# Patient Record
Sex: FEMALE | Race: WHITE | Employment: UNEMPLOYED | URBAN - METROPOLITAN AREA
[De-identification: names, ages, dates, MRNs, and addresses within clinical notes are randomized per-mention and may not be internally consistent; named-entity substitution may affect disease eponyms.]

---

## 2018-05-31 ENCOUNTER — HOSPITAL ENCOUNTER (OUTPATIENT)
Dept: NON INVASIVE DIAGNOSTICS | Age: 68
Discharge: HOME | End: 2018-05-31
Payer: MEDICARE

## 2018-05-31 DIAGNOSIS — Z01.810 PRE-OPERATIVE CARDIOVASCULAR EXAMINATION: ICD-10-CM

## 2018-05-31 LAB
ATRIAL RATE: 83 BPM
CALCULATED P AXIS, ECG09: 70 DEGREES
CALCULATED R AXIS, ECG10: 49 DEGREES
CALCULATED T AXIS, ECG11: 44 DEGREES
DIAGNOSIS, 93000: NORMAL
P-R INTERVAL, ECG05: 154 MS
Q-T INTERVAL, ECG07: 386 MS
QRS DURATION, ECG06: 88 MS
QTC CALCULATION (BEZET), ECG08: 453 MS
VENTRICULAR RATE, ECG03: 83 BPM

## 2018-05-31 PROCEDURE — 93005 ELECTROCARDIOGRAM TRACING: CPT

## 2021-07-07 NOTE — PERIOP NOTES
COPIED FROM SCHEDULING NOTES:  SCHED.  PAT APPT 7-8-21 AT 1130/ALL COVID QUESTIONS ANSWERED/PT FULLY VACCINATED MODERNA 3-1-21 AND 3-29-21/PT INSTRUCTED TO BRING MODERNA CARD TO PAT APPT

## 2021-07-09 ENCOUNTER — HOSPITAL ENCOUNTER (OUTPATIENT)
Dept: PREADMISSION TESTING | Age: 71
Discharge: HOME | End: 2021-07-09
Payer: MEDICARE

## 2021-07-09 VITALS
TEMPERATURE: 99.1 F | RESPIRATION RATE: 18 BRPM | WEIGHT: 185.19 LBS | BODY MASS INDEX: 34.08 KG/M2 | SYSTOLIC BLOOD PRESSURE: 152 MMHG | HEIGHT: 62 IN | DIASTOLIC BLOOD PRESSURE: 85 MMHG | HEART RATE: 82 BPM

## 2021-07-09 LAB
ABO + RH BLD: NORMAL
ANION GAP SERPL CALC-SCNC: 5 MMOL/L (ref 5–15)
APPEARANCE UR: CLEAR
ATRIAL RATE: 96 BPM
BACTERIA URNS QL MICRO: NEGATIVE /HPF
BILIRUB UR QL: NEGATIVE
BLOOD GROUP ANTIBODIES SERPL: NORMAL
BUN SERPL-MCNC: 13 MG/DL (ref 6–20)
BUN/CREAT SERPL: 16 (ref 12–20)
CALCIUM SERPL-MCNC: 9.1 MG/DL (ref 8.5–10.1)
CALCULATED P AXIS, ECG09: 68 DEGREES
CALCULATED R AXIS, ECG10: 42 DEGREES
CALCULATED T AXIS, ECG11: 40 DEGREES
CHLORIDE SERPL-SCNC: 106 MMOL/L (ref 97–108)
CO2 SERPL-SCNC: 29 MMOL/L (ref 21–32)
COLOR UR: NORMAL
CREAT SERPL-MCNC: 0.83 MG/DL (ref 0.55–1.02)
DIAGNOSIS, 93000: NORMAL
EPITH CASTS URNS QL MICRO: NORMAL /LPF
ERYTHROCYTE [DISTWIDTH] IN BLOOD BY AUTOMATED COUNT: 13.2 % (ref 11.5–14.5)
EST. AVERAGE GLUCOSE BLD GHB EST-MCNC: 111 MG/DL
GLUCOSE SERPL-MCNC: 94 MG/DL (ref 65–100)
GLUCOSE UR STRIP.AUTO-MCNC: NEGATIVE MG/DL
HBA1C MFR BLD: 5.5 % (ref 4–5.6)
HCT VFR BLD AUTO: 40.2 % (ref 35–47)
HGB BLD-MCNC: 12.2 G/DL (ref 11.5–16)
HGB UR QL STRIP: NEGATIVE
HYALINE CASTS URNS QL MICRO: NORMAL /LPF (ref 0–5)
INR PPP: 0.9 (ref 0.9–1.1)
KETONES UR QL STRIP.AUTO: NEGATIVE MG/DL
LEUKOCYTE ESTERASE UR QL STRIP.AUTO: NEGATIVE
MCH RBC QN AUTO: 28 PG (ref 26–34)
MCHC RBC AUTO-ENTMCNC: 30.3 G/DL (ref 30–36.5)
MCV RBC AUTO: 92.4 FL (ref 80–99)
NITRITE UR QL STRIP.AUTO: NEGATIVE
NRBC # BLD: 0 K/UL (ref 0–0.01)
NRBC BLD-RTO: 0 PER 100 WBC
P-R INTERVAL, ECG05: 154 MS
PH UR STRIP: 6.5 [PH] (ref 5–8)
PLATELET # BLD AUTO: 223 K/UL (ref 150–400)
PMV BLD AUTO: 9.8 FL (ref 8.9–12.9)
POTASSIUM SERPL-SCNC: 4.2 MMOL/L (ref 3.5–5.1)
PROT UR STRIP-MCNC: NEGATIVE MG/DL
PROTHROMBIN TIME: 9.9 SEC (ref 9–11.1)
Q-T INTERVAL, ECG07: 336 MS
QRS DURATION, ECG06: 82 MS
QTC CALCULATION (BEZET), ECG08: 424 MS
RBC # BLD AUTO: 4.35 M/UL (ref 3.8–5.2)
RBC #/AREA URNS HPF: NORMAL /HPF (ref 0–5)
SODIUM SERPL-SCNC: 140 MMOL/L (ref 136–145)
SP GR UR REFRACTOMETRY: 1.01 (ref 1–1.03)
SPECIMEN EXP DATE BLD: NORMAL
UA: UC IF INDICATED,UAUC: NORMAL
UROBILINOGEN UR QL STRIP.AUTO: 0.2 EU/DL (ref 0.2–1)
VENTRICULAR RATE, ECG03: 96 BPM
WBC # BLD AUTO: 5.9 K/UL (ref 3.6–11)
WBC URNS QL MICRO: NORMAL /HPF (ref 0–4)

## 2021-07-09 PROCEDURE — 36415 COLL VENOUS BLD VENIPUNCTURE: CPT

## 2021-07-09 PROCEDURE — 93005 ELECTROCARDIOGRAM TRACING: CPT

## 2021-07-09 PROCEDURE — 86901 BLOOD TYPING SEROLOGIC RH(D): CPT

## 2021-07-09 PROCEDURE — 85027 COMPLETE CBC AUTOMATED: CPT

## 2021-07-09 PROCEDURE — 85610 PROTHROMBIN TIME: CPT

## 2021-07-09 PROCEDURE — 83036 HEMOGLOBIN GLYCOSYLATED A1C: CPT

## 2021-07-09 PROCEDURE — 81001 URINALYSIS AUTO W/SCOPE: CPT

## 2021-07-09 PROCEDURE — 80048 BASIC METABOLIC PNL TOTAL CA: CPT

## 2021-07-09 RX ORDER — NAPROXEN SODIUM 220 MG
220 TABLET ORAL
COMMUNITY
End: 2021-07-18

## 2021-07-09 RX ORDER — OMEPRAZOLE 20 MG/1
20 CAPSULE, DELAYED RELEASE ORAL
COMMUNITY

## 2021-07-09 RX ORDER — ACETAMINOPHEN 325 MG/1
650 TABLET ORAL
COMMUNITY

## 2021-07-09 RX ORDER — CETIRIZINE HCL 10 MG
10 TABLET ORAL
COMMUNITY

## 2021-07-09 NOTE — H&P
Ronald Mayorga  : 1950   / Language: Atilio Asencio / Race: White  Female      History of Present Illness   The patient is a 69year old female who presents to the practice today for a transition into care. Additional reasons for visit:    Knee Pain is described as the following: The knee pain is described as being located in the entire knee (bilateral). Note for \"Knee Pain\": Patient presents today with her family for evaluation of bilateral knees. Left is now more painful than the right. Both knees have had x-rays in the past which showed bone-on-bone. She has had injections of steroids that only last for very short period of time. Activities of daily living are declining. She at this point is ready to consider knee replacement. She is here today with her daughter.       Problem List/Past Medical  Left Knee Swelling (719.06  M25.469)    REVIEW OF SYSTEMS: Systems were reviewed by the provider.    Left PF Chondromalacia (717.7  M22.42)    Knee effusion, left (719.06  M25.462)    DIETARY COUNSELING (V65.3)    Closed nondisplaced fracture of fifth left metatarsal bone, initial encounter (825.25  S92.355A)    Right PF Chondromalacia (717.7  M22.41)    Right hip pain (719.45  M25.551)    Primary osteoarthritis of left knee (715.16  M17.12)    Pain and swelling of ankle, left (719.47  M25.572, M25.472)    Primary osteoarthritis of right knee (715.16  M17.11)    Chronic pain of left knee (719.46  M25.562)    Weight above 97th percentile (V49.89  Z78.9)    Cyst, baker's knee, left (727.51  M71.22)    Chronic pain of right knee (719.46  M25.561)    Left Meniscus tear (836.2  S83.209A)    Arthritis of right hip (716.95  M16.11)    RIGHT ANKLE PAIN (719.47) (719.47  M25.571)    S/P arthroscopy of knee (V45.89  Z98.890)    Patient was referred to their primary care physician for Blood Pressure screening.    Generalized obesity (278.00  E66.9)    Bursitis, trochanteric (726.5)    Acute foot pain, left (729.5  M79.672)      Allergies   Erythromycins    Allergies Reconciled      Family History   Hypertension    Cancer    Breast cancer      Social History   Caffeine use   11/27/2013: Drinks coffee and soft drinks 3-4 times a day. Sun Exposure   11/27/2013: rarely  Tobacco use   Never smoker. Seat Belt Use   11/27/2013: always  Exercise   11/27/2013: Walks occasionally. Alcohol use   11/27/2013: Drinks 3 times per week. Illicit drug use   38/36/1295: none    Medication History   Diclofenac Sodium  (75MG Tablet DR, 1 (one) Tablet Oral BID, Taken starting 06/12/2018) Active. Claritin  (10MG Capsule, Oral) Active. Ibuprofen  (200MG Capsule, Oral) Active. Azelastine HCl  (0.1% Solution, Nasal) Active. ProAir RespiClick  (126 (90 Base)MCG/ACT Aero Pow Br Act, Inhalation) Active. Medications Reconciled     Past Surgical History  No pertinent past surgical history      Other Problems   Migraine Headache    Unspecified Diagnosis    Hip pain, left (719.45  M25.552)    Treatment options were discussed with the patient in full.          Review of Systems   General Not Present- Chills and Fatigue. Skin Not Present- Bruising, Pallor and Skin Color Changes. Respiratory Not Present- Cough and Difficulty Breathing. Cardiovascular Not Present- Chest Pain, Fainting / Blacking Out and Rapid Heart Rate. Musculoskeletal Present- Joint Pain. Not Present- Decreased Range of Motion and Joint Swelling. Neurological Not Present- Dysesthesia, Paresthesias and Weakness In Extremities. Hematology Not Present- Abnormal Bleeding, Blood Clots and Petechiae. Vitals  Weight: 175 lb   Height: 62 in   Weight was reported by patient. Height was reported by patient. Body Surface Area: 1.81 m²   Body Mass Index: 32.01 kg/m²      Physical Exam  Musculoskeletal  Global Assessment  Examination of related systems reveals - well-developed, well-nourished, in no acute distress, alert and oriented x 3.  Gait and Station - normal gait and station. Right Lower Extremity - Note: Patient's hip was examined. Impingement and apprehension signs are negative. Hip extends fully. Hip flexes to 90+ degrees. There is no trochanteric tenderness. Straight leg raise and femoral nerve stretch test are negative. Extremity is sensate and perfused with palpable dorsalis pedis pulse. Hip flexors, quads, ankle plantar flexors, ankle dorsiflexors have 5 out of 5 strength. Knee shows a slight varus overall alignment. Pain is along the medial joint line. There is no knee instability. Range is 5 to 120 degrees. Left Lower Extremity - Note: Patient's hip was examined. Impingement and apprehension signs are negative. Hip extends fully. Hip flexes to 90+ degrees. There is no trochanteric tenderness. Straight leg raise and femoral nerve stretch test are negative. Extremity is sensate and perfused with palpable dorsalis pedis pulse. Hip flexors, quads, ankle plantar flexors, ankle dorsiflexors have 5 out of 5 strength. Knee shows a slight varus overall alignment. Pain is along the medial joint line. There is no knee instability. Range is 5 to 120 degrees. Assessment & Plan     Primary osteoarthritis of both knees (715.16  M17.0)  Impression: Discussed options. Decline in function. She is of appropriate age. No other contraindications. Plan is for a left total knee. All questions were answered. Current Plans  Pt Education - How to [address] using Patient Portal and 3rd Party Apps: discussed with patient and provided information. Pt Education - Educational materials were provided.: discussed with patient and provided information.     REVIEW OF SYSTEMS: Systems were reviewed by the provider.(V49.9)      Weight above 97th percentile (V49.89  Z78.9)    Current Plans  LIFESTYLE EDUCATION REGARDING DIET (23297)    Bilateral chronic knee pain (719.46  M25.561)    Current Plans  X-RAY EXAM OF BOTH KNEES, STANDING AP (95113) (Bilateral AP Standing views were taken today using Digital Radiography. Both knees were x-rayed. Reviewed prior films.  Both knees are bone against bone medially with osteophytes and cyst.)      Signed by Maria Eugenia Figueredo MD (5/21/20

## 2021-07-09 NOTE — PERIOP NOTES
Preoperative and medication instructions reviewed with patient , surgical site infection sheet given,  MRSA instructions given and reviewed chg soap x 2 given and instructions on how to use chg soap correctly. Patient given opportunity to ask questions and all questions were answered.  Information given regarding covid testing and arrival to hospital on day of surgery    [address] 03/01/2021 AND 03/29/2021  COPY OF VACCINE RECORD ON CHART

## 2021-07-10 LAB
BACTERIA SPEC CULT: NORMAL
BACTERIA SPEC CULT: NORMAL
SERVICE CMNT-IMP: NORMAL

## 2021-07-12 RX ORDER — MUPIROCIN 20 MG/G
OINTMENT TOPICAL 2 TIMES DAILY
Qty: 22 G | Refills: 0 | Status: SHIPPED | OUTPATIENT
Start: 2021-07-12 | End: 2021-07-19

## 2021-07-12 NOTE — PERIOP NOTES
PC to pt, full name and  verified, regarding positive nasal cx (MSSA) and need to start Mupirocin ointment BID x 7 days to B nostrils starting today and bathe with CHG soap for 7 days prior to surgery. Pt verbalized understanding of instructions and will start today as recommended. Allergies and pharmacy of choice reviewed. Rx escribed to pt's pharmacy of choice. PTA medlist updated. Surgeon and PCP notified of positive culture and treatment. PRESCRIPTION:    MUPIROCIN 2% OINTMENT  QUANTITY:  #22 GRAMS  REFILLS: NONE    Apply 0.25 g (small pea-sized amount) to both nostrils twice a day for seven days.     Salma Chase, NP

## 2021-07-15 ENCOUNTER — ANESTHESIA EVENT (OUTPATIENT)
Dept: SURGERY | Age: 71
End: 2021-07-15
Payer: MEDICARE

## 2021-07-16 ENCOUNTER — HOSPITAL ENCOUNTER (OUTPATIENT)
Age: 71
Discharge: HOME HEALTH | End: 2021-07-18
Attending: ORTHOPAEDIC SURGERY | Admitting: ORTHOPAEDIC SURGERY
Payer: MEDICARE

## 2021-07-16 ENCOUNTER — ANESTHESIA (OUTPATIENT)
Dept: SURGERY | Age: 71
End: 2021-07-16
Payer: MEDICARE

## 2021-07-16 DIAGNOSIS — M17.12 PRIMARY OSTEOARTHRITIS OF LEFT KNEE: Primary | ICD-10-CM

## 2021-07-16 PROBLEM — M17.0 LOCALIZED OSTEOARTHRITIS OF KNEES, BILATERAL: Status: ACTIVE | Noted: 2021-07-16

## 2021-07-16 LAB
GLUCOSE BLD STRIP.AUTO-MCNC: 90 MG/DL (ref 65–117)
SERVICE CMNT-IMP: NORMAL

## 2021-07-16 PROCEDURE — 97530 THERAPEUTIC ACTIVITIES: CPT

## 2021-07-16 PROCEDURE — 97161 PT EVAL LOW COMPLEX 20 MIN: CPT

## 2021-07-16 PROCEDURE — 77030018673: Performed by: ORTHOPAEDIC SURGERY

## 2021-07-16 PROCEDURE — C1776 JOINT DEVICE (IMPLANTABLE): HCPCS | Performed by: ORTHOPAEDIC SURGERY

## 2021-07-16 PROCEDURE — 77030031139 HC SUT VCRL2 J&J -A: Performed by: ORTHOPAEDIC SURGERY

## 2021-07-16 PROCEDURE — 74011000258 HC RX REV CODE- 258: Performed by: NURSE ANESTHETIST, CERTIFIED REGISTERED

## 2021-07-16 PROCEDURE — 74011250636 HC RX REV CODE- 250/636: Performed by: ANESTHESIOLOGY

## 2021-07-16 PROCEDURE — 74011000250 HC RX REV CODE- 250: Performed by: ANESTHESIOLOGY

## 2021-07-16 PROCEDURE — 2709999900 HC NON-CHARGEABLE SUPPLY: Performed by: ORTHOPAEDIC SURGERY

## 2021-07-16 PROCEDURE — 74011000250 HC RX REV CODE- 250: Performed by: PHYSICIAN ASSISTANT

## 2021-07-16 PROCEDURE — 77030008463 HC STPLR SKN PROX J&J -B: Performed by: ORTHOPAEDIC SURGERY

## 2021-07-16 PROCEDURE — 74011250636 HC RX REV CODE- 250/636: Performed by: ORTHOPAEDIC SURGERY

## 2021-07-16 PROCEDURE — 74011250637 HC RX REV CODE- 250/637: Performed by: PHYSICIAN ASSISTANT

## 2021-07-16 PROCEDURE — 77030000032 HC CUF TRNQT ZIMM -B: Performed by: ORTHOPAEDIC SURGERY

## 2021-07-16 PROCEDURE — 77030005513 HC CATH URETH FOL11 MDII -B: Performed by: ORTHOPAEDIC SURGERY

## 2021-07-16 PROCEDURE — 74011000250 HC RX REV CODE- 250: Performed by: ORTHOPAEDIC SURGERY

## 2021-07-16 PROCEDURE — 74011250636 HC RX REV CODE- 250/636: Performed by: NURSE ANESTHETIST, CERTIFIED REGISTERED

## 2021-07-16 PROCEDURE — C1713 ANCHOR/SCREW BN/BN,TIS/BN: HCPCS | Performed by: ORTHOPAEDIC SURGERY

## 2021-07-16 PROCEDURE — C9290 INJ, BUPIVACAINE LIPOSOME: HCPCS | Performed by: ORTHOPAEDIC SURGERY

## 2021-07-16 PROCEDURE — 77030008462 HC STPLR SKN PROX J&J -A: Performed by: ORTHOPAEDIC SURGERY

## 2021-07-16 PROCEDURE — 74011000250 HC RX REV CODE- 250: Performed by: NURSE ANESTHETIST, CERTIFIED REGISTERED

## 2021-07-16 PROCEDURE — 77030006822 HC BLD SAW SAG BRSM -B: Performed by: ORTHOPAEDIC SURGERY

## 2021-07-16 PROCEDURE — 77030041279 HC DRSG PRMSL AG MDII -B: Performed by: ORTHOPAEDIC SURGERY

## 2021-07-16 PROCEDURE — 77030010783 HC BOWL MX BN CEM J&J -B: Performed by: ORTHOPAEDIC SURGERY

## 2021-07-16 PROCEDURE — 97116 GAIT TRAINING THERAPY: CPT

## 2021-07-16 PROCEDURE — 76060000035 HC ANESTHESIA 2 TO 2.5 HR: Performed by: ORTHOPAEDIC SURGERY

## 2021-07-16 PROCEDURE — 74011250637 HC RX REV CODE- 250/637: Performed by: ANESTHESIOLOGY

## 2021-07-16 PROCEDURE — 77030035236 HC SUT PDS STRATFX BARB J&J -B: Performed by: ORTHOPAEDIC SURGERY

## 2021-07-16 PROCEDURE — 77030040361 HC SLV COMPR DVT MDII -B

## 2021-07-16 PROCEDURE — 82962 GLUCOSE BLOOD TEST: CPT

## 2021-07-16 PROCEDURE — 76210000006 HC OR PH I REC 0.5 TO 1 HR: Performed by: ORTHOPAEDIC SURGERY

## 2021-07-16 PROCEDURE — 76010000171 HC OR TIME 2 TO 2.5 HR INTENSV-TIER 1: Performed by: ORTHOPAEDIC SURGERY

## 2021-07-16 PROCEDURE — 74011000258 HC RX REV CODE- 258: Performed by: ORTHOPAEDIC SURGERY

## 2021-07-16 PROCEDURE — 74011250636 HC RX REV CODE- 250/636: Performed by: PHYSICIAN ASSISTANT

## 2021-07-16 DEVICE — SMARTSET GHV GENTAMICIN HIGH VISCOSITY BONE CEMENT 40G
Type: IMPLANTABLE DEVICE | Site: KNEE | Status: FUNCTIONAL
Brand: SMARTSET

## 2021-07-16 DEVICE — IMPLANTABLE DEVICE
Type: IMPLANTABLE DEVICE | Site: KNEE | Status: FUNCTIONAL
Brand: PERSONA®

## 2021-07-16 DEVICE — IMPLANTABLE DEVICE
Type: IMPLANTABLE DEVICE | Site: KNEE | Status: FUNCTIONAL
Brand: PERSONA® VIVACIT-E®

## 2021-07-16 DEVICE — IMPLANTABLE DEVICE
Type: IMPLANTABLE DEVICE | Site: KNEE | Status: FUNCTIONAL
Brand: PERSONA® NATURAL TIBIA®

## 2021-07-16 DEVICE — KNEE K1 TOT HEMI STD CEM IMPL CAPPED K1 ZIM: Type: IMPLANTABLE DEVICE | Status: FUNCTIONAL

## 2021-07-16 RX ORDER — MIDAZOLAM HYDROCHLORIDE 1 MG/ML
INJECTION, SOLUTION INTRAMUSCULAR; INTRAVENOUS AS NEEDED
Status: DISCONTINUED | OUTPATIENT
Start: 2021-07-16 | End: 2021-07-16 | Stop reason: HOSPADM

## 2021-07-16 RX ORDER — FACIAL-BODY WIPES
10 EACH TOPICAL DAILY PRN
Status: DISCONTINUED | OUTPATIENT
Start: 2021-07-18 | End: 2021-07-18 | Stop reason: HOSPADM

## 2021-07-16 RX ORDER — SODIUM CHLORIDE 9 MG/ML
125 INJECTION, SOLUTION INTRAVENOUS CONTINUOUS
Status: DISPENSED | OUTPATIENT
Start: 2021-07-16 | End: 2021-07-17

## 2021-07-16 RX ORDER — ONDANSETRON 2 MG/ML
INJECTION INTRAMUSCULAR; INTRAVENOUS AS NEEDED
Status: DISCONTINUED | OUTPATIENT
Start: 2021-07-16 | End: 2021-07-16 | Stop reason: HOSPADM

## 2021-07-16 RX ORDER — SODIUM CHLORIDE 0.9 % (FLUSH) 0.9 %
5-40 SYRINGE (ML) INJECTION AS NEEDED
Status: DISCONTINUED | OUTPATIENT
Start: 2021-07-16 | End: 2021-07-18 | Stop reason: HOSPADM

## 2021-07-16 RX ORDER — AMOXICILLIN 250 MG
1 CAPSULE ORAL 2 TIMES DAILY
Qty: 50 TABLET | Refills: 0 | Status: SHIPPED | OUTPATIENT
Start: 2021-07-16

## 2021-07-16 RX ORDER — HYDROXYZINE HYDROCHLORIDE 10 MG/1
10 TABLET, FILM COATED ORAL
Status: DISCONTINUED | OUTPATIENT
Start: 2021-07-16 | End: 2021-07-18 | Stop reason: HOSPADM

## 2021-07-16 RX ORDER — FENTANYL CITRATE 50 UG/ML
50 INJECTION, SOLUTION INTRAMUSCULAR; INTRAVENOUS AS NEEDED
Status: DISCONTINUED | OUTPATIENT
Start: 2021-07-16 | End: 2021-07-16 | Stop reason: HOSPADM

## 2021-07-16 RX ORDER — SODIUM CHLORIDE, SODIUM LACTATE, POTASSIUM CHLORIDE, CALCIUM CHLORIDE 600; 310; 30; 20 MG/100ML; MG/100ML; MG/100ML; MG/100ML
125 INJECTION, SOLUTION INTRAVENOUS CONTINUOUS
Status: DISCONTINUED | OUTPATIENT
Start: 2021-07-16 | End: 2021-07-16 | Stop reason: HOSPADM

## 2021-07-16 RX ORDER — DIPHENHYDRAMINE HYDROCHLORIDE 50 MG/ML
12.5 INJECTION, SOLUTION INTRAMUSCULAR; INTRAVENOUS AS NEEDED
Status: DISCONTINUED | OUTPATIENT
Start: 2021-07-16 | End: 2021-07-16 | Stop reason: HOSPADM

## 2021-07-16 RX ORDER — OXYCODONE HYDROCHLORIDE 5 MG/1
5 TABLET ORAL AS NEEDED
Status: DISCONTINUED | OUTPATIENT
Start: 2021-07-16 | End: 2021-07-16 | Stop reason: HOSPADM

## 2021-07-16 RX ORDER — AZELASTINE HYDROCHLORIDE, FLUTICASONE PROPIONATE 137; 50 UG/1; UG/1
2 SPRAY, METERED NASAL EVERY 12 HOURS
Status: DISCONTINUED | OUTPATIENT
Start: 2021-07-16 | End: 2021-07-18 | Stop reason: HOSPADM

## 2021-07-16 RX ORDER — ONDANSETRON 2 MG/ML
4 INJECTION INTRAMUSCULAR; INTRAVENOUS
Status: ACTIVE | OUTPATIENT
Start: 2021-07-16 | End: 2021-07-18

## 2021-07-16 RX ORDER — POLYETHYLENE GLYCOL 3350 17 G/17G
17 POWDER, FOR SOLUTION ORAL DAILY
Status: DISCONTINUED | OUTPATIENT
Start: 2021-07-17 | End: 2021-07-18 | Stop reason: HOSPADM

## 2021-07-16 RX ORDER — SODIUM CHLORIDE 0.9 % (FLUSH) 0.9 %
5-40 SYRINGE (ML) INJECTION AS NEEDED
Status: DISCONTINUED | OUTPATIENT
Start: 2021-07-16 | End: 2021-07-16 | Stop reason: HOSPADM

## 2021-07-16 RX ORDER — MUPIROCIN 20 MG/G
OINTMENT TOPICAL 2 TIMES DAILY
Status: DISCONTINUED | OUTPATIENT
Start: 2021-07-16 | End: 2021-07-18 | Stop reason: HOSPADM

## 2021-07-16 RX ORDER — NALOXONE HYDROCHLORIDE 0.4 MG/ML
0.4 INJECTION, SOLUTION INTRAMUSCULAR; INTRAVENOUS; SUBCUTANEOUS AS NEEDED
Status: DISCONTINUED | OUTPATIENT
Start: 2021-07-16 | End: 2021-07-18 | Stop reason: HOSPADM

## 2021-07-16 RX ORDER — FENTANYL CITRATE 50 UG/ML
25 INJECTION, SOLUTION INTRAMUSCULAR; INTRAVENOUS
Status: DISCONTINUED | OUTPATIENT
Start: 2021-07-16 | End: 2021-07-16 | Stop reason: HOSPADM

## 2021-07-16 RX ORDER — OXYCODONE HYDROCHLORIDE 5 MG/1
5 TABLET ORAL
Status: DISCONTINUED | OUTPATIENT
Start: 2021-07-16 | End: 2021-07-18 | Stop reason: HOSPADM

## 2021-07-16 RX ORDER — ACETAMINOPHEN 500 MG
1000 TABLET ORAL EVERY 6 HOURS
Status: DISCONTINUED | OUTPATIENT
Start: 2021-07-16 | End: 2021-07-18 | Stop reason: HOSPADM

## 2021-07-16 RX ORDER — KETAMINE HYDROCHLORIDE 10 MG/ML
INJECTION, SOLUTION INTRAMUSCULAR; INTRAVENOUS AS NEEDED
Status: DISCONTINUED | OUTPATIENT
Start: 2021-07-16 | End: 2021-07-16 | Stop reason: HOSPADM

## 2021-07-16 RX ORDER — DEXAMETHASONE SODIUM PHOSPHATE 4 MG/ML
INJECTION, SOLUTION INTRA-ARTICULAR; INTRALESIONAL; INTRAMUSCULAR; INTRAVENOUS; SOFT TISSUE AS NEEDED
Status: DISCONTINUED | OUTPATIENT
Start: 2021-07-16 | End: 2021-07-16 | Stop reason: HOSPADM

## 2021-07-16 RX ORDER — PANTOPRAZOLE SODIUM 40 MG/1
40 TABLET, DELAYED RELEASE ORAL
Status: DISCONTINUED | OUTPATIENT
Start: 2021-07-17 | End: 2021-07-18 | Stop reason: HOSPADM

## 2021-07-16 RX ORDER — LIDOCAINE HYDROCHLORIDE 10 MG/ML
0.5 INJECTION, SOLUTION EPIDURAL; INFILTRATION; INTRACAUDAL; PERINEURAL AS NEEDED
Status: DISCONTINUED | OUTPATIENT
Start: 2021-07-16 | End: 2021-07-16 | Stop reason: HOSPADM

## 2021-07-16 RX ORDER — AMOXICILLIN 250 MG
1 CAPSULE ORAL 2 TIMES DAILY
Status: DISCONTINUED | OUTPATIENT
Start: 2021-07-16 | End: 2021-07-18 | Stop reason: HOSPADM

## 2021-07-16 RX ORDER — ASPIRIN 325 MG
325 TABLET ORAL DAILY
Qty: 60 TABLET | Refills: 0 | Status: SHIPPED | OUTPATIENT
Start: 2021-07-16 | End: 2022-05-11

## 2021-07-16 RX ORDER — MIDAZOLAM HYDROCHLORIDE 1 MG/ML
1 INJECTION, SOLUTION INTRAMUSCULAR; INTRAVENOUS AS NEEDED
Status: DISCONTINUED | OUTPATIENT
Start: 2021-07-16 | End: 2021-07-16 | Stop reason: HOSPADM

## 2021-07-16 RX ORDER — DEXTROSE, SODIUM CHLORIDE, SODIUM LACTATE, POTASSIUM CHLORIDE, AND CALCIUM CHLORIDE 5; .6; .31; .03; .02 G/100ML; G/100ML; G/100ML; G/100ML; G/100ML
125 INJECTION, SOLUTION INTRAVENOUS CONTINUOUS
Status: DISCONTINUED | OUTPATIENT
Start: 2021-07-16 | End: 2021-07-16 | Stop reason: HOSPADM

## 2021-07-16 RX ORDER — PROPOFOL 10 MG/ML
INJECTION, EMULSION INTRAVENOUS AS NEEDED
Status: DISCONTINUED | OUTPATIENT
Start: 2021-07-16 | End: 2021-07-16 | Stop reason: HOSPADM

## 2021-07-16 RX ORDER — MORPHINE SULFATE 2 MG/ML
2 INJECTION, SOLUTION INTRAMUSCULAR; INTRAVENOUS
Status: DISCONTINUED | OUTPATIENT
Start: 2021-07-16 | End: 2021-07-16 | Stop reason: HOSPADM

## 2021-07-16 RX ORDER — ROPIVACAINE HYDROCHLORIDE 5 MG/ML
INJECTION, SOLUTION EPIDURAL; INFILTRATION; PERINEURAL
Status: COMPLETED | OUTPATIENT
Start: 2021-07-16 | End: 2021-07-16

## 2021-07-16 RX ORDER — MIDAZOLAM HYDROCHLORIDE 1 MG/ML
1 INJECTION, SOLUTION INTRAMUSCULAR; INTRAVENOUS
Status: DISCONTINUED | OUTPATIENT
Start: 2021-07-16 | End: 2021-07-16 | Stop reason: HOSPADM

## 2021-07-16 RX ORDER — SODIUM CHLORIDE 0.9 % (FLUSH) 0.9 %
5-40 SYRINGE (ML) INJECTION EVERY 8 HOURS
Status: DISCONTINUED | OUTPATIENT
Start: 2021-07-16 | End: 2021-07-16 | Stop reason: HOSPADM

## 2021-07-16 RX ORDER — CETIRIZINE HCL 10 MG
10 TABLET ORAL
Status: DISCONTINUED | OUTPATIENT
Start: 2021-07-16 | End: 2021-07-18 | Stop reason: HOSPADM

## 2021-07-16 RX ORDER — OXYCODONE HYDROCHLORIDE 5 MG/1
10 TABLET ORAL
Status: DISCONTINUED | OUTPATIENT
Start: 2021-07-16 | End: 2021-07-18 | Stop reason: HOSPADM

## 2021-07-16 RX ORDER — SODIUM CHLORIDE 0.9 % (FLUSH) 0.9 %
5-40 SYRINGE (ML) INJECTION EVERY 8 HOURS
Status: DISCONTINUED | OUTPATIENT
Start: 2021-07-16 | End: 2021-07-18 | Stop reason: HOSPADM

## 2021-07-16 RX ORDER — ACETAMINOPHEN 325 MG/1
650 TABLET ORAL ONCE
Status: COMPLETED | OUTPATIENT
Start: 2021-07-16 | End: 2021-07-16

## 2021-07-16 RX ORDER — FENTANYL CITRATE 50 UG/ML
INJECTION, SOLUTION INTRAMUSCULAR; INTRAVENOUS AS NEEDED
Status: DISCONTINUED | OUTPATIENT
Start: 2021-07-16 | End: 2021-07-16 | Stop reason: HOSPADM

## 2021-07-16 RX ORDER — SODIUM CHLORIDE, SODIUM LACTATE, POTASSIUM CHLORIDE, CALCIUM CHLORIDE 600; 310; 30; 20 MG/100ML; MG/100ML; MG/100ML; MG/100ML
INJECTION, SOLUTION INTRAVENOUS
Status: DISCONTINUED | OUTPATIENT
Start: 2021-07-16 | End: 2021-07-16 | Stop reason: HOSPADM

## 2021-07-16 RX ORDER — BUPIVACAINE HYDROCHLORIDE 5 MG/ML
INJECTION, SOLUTION EPIDURAL; INTRACAUDAL
Status: COMPLETED | OUTPATIENT
Start: 2021-07-16 | End: 2021-07-16

## 2021-07-16 RX ORDER — ASPIRIN 325 MG
325 TABLET, DELAYED RELEASE (ENTERIC COATED) ORAL 2 TIMES DAILY
Status: DISCONTINUED | OUTPATIENT
Start: 2021-07-16 | End: 2021-07-18 | Stop reason: HOSPADM

## 2021-07-16 RX ORDER — OXYCODONE HYDROCHLORIDE 5 MG/1
5-10 TABLET ORAL
Qty: 42 TABLET | Refills: 0 | Status: SHIPPED | OUTPATIENT
Start: 2021-07-16 | End: 2021-07-23

## 2021-07-16 RX ORDER — KETOROLAC TROMETHAMINE 30 MG/ML
15 INJECTION, SOLUTION INTRAMUSCULAR; INTRAVENOUS
Status: DISCONTINUED | OUTPATIENT
Start: 2021-07-16 | End: 2021-07-18 | Stop reason: HOSPADM

## 2021-07-16 RX ORDER — HYDROMORPHONE HYDROCHLORIDE 1 MG/ML
0.5 INJECTION, SOLUTION INTRAMUSCULAR; INTRAVENOUS; SUBCUTANEOUS
Status: ACTIVE | OUTPATIENT
Start: 2021-07-16 | End: 2021-07-17

## 2021-07-16 RX ORDER — ONDANSETRON 2 MG/ML
4 INJECTION INTRAMUSCULAR; INTRAVENOUS AS NEEDED
Status: DISCONTINUED | OUTPATIENT
Start: 2021-07-16 | End: 2021-07-16 | Stop reason: HOSPADM

## 2021-07-16 RX ADMIN — DEXAMETHASONE SODIUM PHOSPHATE 4 MG: 4 INJECTION, SOLUTION INTRAMUSCULAR; INTRAVENOUS at 09:47

## 2021-07-16 RX ADMIN — PHENYLEPHRINE HYDROCHLORIDE 40 MCG/MIN: 10 INJECTION INTRAVENOUS at 09:36

## 2021-07-16 RX ADMIN — ONDANSETRON HYDROCHLORIDE 4 MG: 2 INJECTION, SOLUTION INTRAMUSCULAR; INTRAVENOUS at 10:05

## 2021-07-16 RX ADMIN — ACETAMINOPHEN 1000 MG: 500 TABLET ORAL at 22:03

## 2021-07-16 RX ADMIN — PROPOFOL 20 MG: 10 INJECTION, EMULSION INTRAVENOUS at 09:15

## 2021-07-16 RX ADMIN — OXYCODONE HYDROCHLORIDE 5 MG: 5 TABLET ORAL at 20:36

## 2021-07-16 RX ADMIN — FENTANYL CITRATE 50 MCG: 50 INJECTION, SOLUTION INTRAMUSCULAR; INTRAVENOUS at 10:55

## 2021-07-16 RX ADMIN — AZELASTINE HYDROCHLORIDE, FLUTICASONE PROPIONATE 2 PUFF: 137; 50 SPRAY, METERED NASAL at 22:04

## 2021-07-16 RX ADMIN — FENTANYL CITRATE 50 MCG: 50 INJECTION, SOLUTION INTRAMUSCULAR; INTRAVENOUS at 07:55

## 2021-07-16 RX ADMIN — PROPOFOL 50 MCG/KG/MIN: 10 INJECTION, EMULSION INTRAVENOUS at 09:06

## 2021-07-16 RX ADMIN — TRANEXAMIC ACID 1 G: 100 INJECTION, SOLUTION INTRAVENOUS at 09:34

## 2021-07-16 RX ADMIN — ROPIVACAINE HYDROCHLORIDE 30 ML: 5 INJECTION, SOLUTION EPIDURAL; INFILTRATION; PERINEURAL at 08:02

## 2021-07-16 RX ADMIN — SODIUM CHLORIDE, POTASSIUM CHLORIDE, SODIUM LACTATE AND CALCIUM CHLORIDE: 600; 310; 30; 20 INJECTION, SOLUTION INTRAVENOUS at 08:57

## 2021-07-16 RX ADMIN — ASPIRIN 325 MG: 325 TABLET, COATED ORAL at 18:13

## 2021-07-16 RX ADMIN — WATER 2 G: 1 INJECTION INTRAMUSCULAR; INTRAVENOUS; SUBCUTANEOUS at 09:24

## 2021-07-16 RX ADMIN — ACETAMINOPHEN 1000 MG: 500 TABLET ORAL at 16:08

## 2021-07-16 RX ADMIN — FENTANYL CITRATE 50 MCG: 50 INJECTION, SOLUTION INTRAMUSCULAR; INTRAVENOUS at 09:10

## 2021-07-16 RX ADMIN — MIDAZOLAM 2 MG: 1 INJECTION INTRAMUSCULAR; INTRAVENOUS at 08:59

## 2021-07-16 RX ADMIN — KETAMINE HYDROCHLORIDE 20 MG: 10 INJECTION, SOLUTION INTRAMUSCULAR; INTRAVENOUS at 09:29

## 2021-07-16 RX ADMIN — PROPOFOL 20 MG: 10 INJECTION, EMULSION INTRAVENOUS at 09:05

## 2021-07-16 RX ADMIN — BUPIVACAINE HYDROCHLORIDE 10 MG: 5 INJECTION, SOLUTION EPIDURAL; INTRACAUDAL; PERINEURAL at 09:23

## 2021-07-16 RX ADMIN — SODIUM CHLORIDE 125 ML/HR: 900 INJECTION, SOLUTION INTRAVENOUS at 12:00

## 2021-07-16 RX ADMIN — SODIUM CHLORIDE, POTASSIUM CHLORIDE, SODIUM LACTATE AND CALCIUM CHLORIDE 125 ML/HR: 600; 310; 30; 20 INJECTION, SOLUTION INTRAVENOUS at 07:25

## 2021-07-16 RX ADMIN — MIDAZOLAM HYDROCHLORIDE 2 MG: 1 INJECTION, SOLUTION INTRAMUSCULAR; INTRAVENOUS at 07:55

## 2021-07-16 RX ADMIN — DOCUSATE SODIUM 50 MG AND SENNOSIDES 8.6 MG 1 TABLET: 8.6; 5 TABLET, FILM COATED ORAL at 18:13

## 2021-07-16 RX ADMIN — CEFAZOLIN SODIUM 2 G: 1 INJECTION, POWDER, FOR SOLUTION INTRAMUSCULAR; INTRAVENOUS at 16:56

## 2021-07-16 RX ADMIN — ACETAMINOPHEN 650 MG: 325 TABLET ORAL at 07:17

## 2021-07-16 NOTE — PROGRESS NOTES
Primary Nurse Isha Max RN and Radha Marie RN performed a dual skin assessment on this patient No impairment noted  Gareth score is

## 2021-07-16 NOTE — PROGRESS NOTES
Bedside shift change report given to Kait VEE (oncoming nurse) by Ye Humphreys  (offgoing nurse). Report included the following information SBAR, Kardex, Intake/Output and MAR.

## 2021-07-16 NOTE — PROGRESS NOTES
Occupational Therapy  7/16/2021    Chart reviewed, referral received. Pt is POD 0 L TKA. Will follow up tomorrow for OT assessment.  Mary Woods, OT

## 2021-07-16 NOTE — ANESTHESIA PROCEDURE NOTES
Spinal Block    Start time: 7/16/2021 9:15 AM  End time: 7/16/2021 9:22 AM  Performed by: Toribio Rock MD  Authorized by: Toribio Rock MD     Pre-procedure:   Indications: primary anesthetic  Preanesthetic Checklist: risks and benefits discussed and timeout performed    Timeout Time: 09:25 EDT          Spinal Block:   Patient Position:  Seated    Prep: Betadine      Location:  L3-4  Technique:  Single shot        Needle:   Needle Type:  Pencil-tip  Needle Gauge:  25 G  Attempts:  1      Events: CSF confirmed, no blood with aspiration and no paresthesia        Assessment:  Insertion:  Uncomplicated  Patient tolerance:  Patient tolerated the procedure well with no immediate complications

## 2021-07-16 NOTE — OP NOTES
Name: David Gonzalez  MRN:  [id number]  : 1950  Age:  69 y.o. Surgery Date: 2021      OPERATIVE REPORT - LEFT TOTAL KNEE REPLACEMENT    PREOPERATIVE DIAGNOSIS: Osteoarthritis, left knee. POSTOPERATIVE DIAGNOSIS: Osteoarthritis, left knee. PROCEDURE PERFORMED: Left total knee arthroplasty. SURGEON: Gunnar Smith MD    FIRST ASSISTANT: Ian Steel PA-C    ANESTHESIA: Spinal    PRE-OP ANTIBIOTIC: Ancef 2g    COMPLICATIONS: None. ESTIMATED BLOOD LOSS: 50 mL. SPECIMENS REMOVED: None. COMPONENTS IMPLANTED:   Implant Name Type Inv. Item Serial No.  Lot No. LRB No. Used Action   CEMENT BNE 40GM FULL DOSE PMMA W/ GENT HI VISC RADPQ LNG - SN/A  CEMENT BNE 40GM FULL DOSE PMMA W/ GENT HI VISC RADPQ LNG N/A [employer]_ [id number] Left 1 Implanted   CEMENT BNE 40GM FULL DOSE PMMA W/ GENT HI VISC RADPQ LNG - SN/A  CEMENT BNE 40GM FULL DOSE PMMA W/ GENT HI VISC RADPQ LNG N/A [employer]_ [id number] Left 1 Implanted   FEM CR GOLDY CCR STD SZ 6 LT -- PERSONA - SN/A  FEM CR GOLDY CCR STD SZ 6 LT -- PERSONA N/A [employer] [id number] Left 1 Implanted   TIB PRN NP STM 5 DEG SZ EL -- PERSONA - SN/A  TIB PRN NP STM 5 DEG SZ EL -- PERSONA N/A [employer] [id number] Left 1 Implanted   PSN MC VE ASF L 11MM 6-7/EF - SN/A  PSN MC VE ASF L 11MM 6-7/EF N/A [employer]_ [id number] Left 1 Implanted   COMPONENT PAT PFF00NP THK8. 5MM STD KNEE VIVACIT-E GOLDY - SN/A  COMPONENT PAT [id number] THK8. 5MM STD KNEE VIVACIT-E GOLDY N/A [employer]_ [id number] Left 1 Implanted       INDICATIONS: The patient is an 69 yrs female with progressive debilitating left knee pain due to severe osteoarthritis. Symptoms have progressed despite comprehensive conservative treatment and the patient presents for left total knee replacement. Risks, benefits, alternatives of the procedure were reviewed with the patient in detail and the patient desires to proceed.  The patient understands the risk for perioperative medical complications. DESCRIPTION OF PROCEDURE: Spinal anesthesia was initiated. Preoperative dose of antibiotic was given. Fraser catheter was not placed. The left lower extremity was prepped and draped in the usual sterile fashion. The skin was covered with Ioban occlusive dressing. The left lower extremity was exsanguinated. A medial parapatellar incision was made to the left knee. The left knee was exposed and the distal femur was cut at 5 degrees distal femoral valgus. Femur was sized for a size 6. An AP cutting block was placed, rotated based on bony landmarks. Femoral cuts were completed for a size 6. The tibia was subluxed and a neutral varus/valgus proximal tibial cut was made matching the patient's slope. The meniscal remnants were removed. The posterior osteophytes were removed from the femur. Gaps were examined. The flexion and extension gaps were 11 mm. The femur was finished for a 6, tibia for a E. Trials were placed. Patella was cut and a 32 mm trial was fitted . The knee tracked well with all trial implants. The trials were then removed. Bony surfaces were drilled, lavaged, and dried. All components were cemented. Excess cement was removed. A 11 mm polyethylene component was placed. After the cement had fully cured, the knee was ranged and  irrigated copiously with pulsatile lavage. All surrounding soft tissues were infiltrated with local anesthetic. The arthrotomy was closed with #2 Vicryl sutures and 0 Vicryl sutures. Skin and subcutaneous tissues were irrigated and closed in standard fashion. Sterile dressing was applied. There were no complications. The procedure was a LEFT TOTAL KNEE REPLACEMENT. A Parminder total knee construct was utilized. No specimens were obtained/sent. The patient was transferred to the recovery room in stable condition. Varus release. Intact PCL.     Ian Steel PA-C was critical throughout the case to assist with positioning, retraction and closure. There were no other available residents or surgical assistants to assist during this procedure.       Sonny Quispe MD

## 2021-07-16 NOTE — DISCHARGE INSTRUCTIONS
Discharge Instructions Knee Replacement  Dr. Meghann Downs      Patient Name  Vik De León  Date of procedure  7/16/2021    Procedure  Procedure(s):  LEFT TOTAL KNEE ARTHROPLASTY  Surgeon  Surgeon(s) and Role:     * Rhonda Kessler MD - Primary  Date of discharge: No discharge date for patient encounter. PCP: Dayna Gotti MD    Follow up care   Follow up visit with Dr. Meghann oDwns in 3 weeks. Call [phone]  to make an appointment   You do have staples in your knee incision. They will be taken out in 14 days by [address] [place] [address] staff. Activity at home   Take a short walk every hour; except at night when sleeping   Do your Home Exercise Program 3 times every day    After exercising lie down and elevate your leg on pillows for 15-30 minutes to decrease swelling   Refer to your patient notebook for more information    Bathing and caring for your incision   You may take a shower with your waterproof dressing on your knee.  The waterproof dressing is to stay on your knee for 7 days.  On the 7th day have someone gently peel the dressing off by lifting the edge and stretching it to break the seal.   You may then leave your incision open to air unless you see drainage from your knee. Preventing blood clots   Take one Aspirin 325 mg tab by mouth twice daily with meals for 3 weeks, then reduce dose to once daily for 3 weeks, then discontinue. every day as prescribed.  Call Dr. Meghann Downs if you have side effects of blood thinning medication: bleeding, bruising, upset stomach, or diarrhea.  Call Dr. Meghann Downs for signs of a blood clot in your leg: calf pain, tenderness, redness, swelling of lower leg    Preventing lung congestion   Use your incentive spirometer 4 times a day; do 10 repetitions each time   Remember to keep the small blue ball between the two arrows when taking a slow, deep breath     Pain Management   Get up and walk a short distance to relieve pain and stiffness.    Place ice wrap on your knee except when you are walking. The gel ice packs should be changed about every 4 hours   Elevate your leg on pillows for 15-30 minutes    Take Tylenol 650mg (take two 325mg tablets) every 6 hours for pain.  If needed, take a narcotic pain pill every 4-6 hours as prescribed.  Take all medications with a small amount of food.  As your pain decreases, take the narcotics less often or take ½ of a pill   Call Dr. Jared Wade if you have side effects from your narcotic pain medication: itching, drowsiness, dizziness, upset stomach, dry mouth, constipation or if you medication is not relieving your pain. Diet after surgery   You may resume your normal diet. Include vegetables, fruit, whole grains, lean meats, and low-fat dairy products. Eat food high in fiber    Drink plenty of fluids, including 8 cups of water daily   Take Senokot-s twice a day to prevent constipation    Avoid after surgery   Do not take any over-the-counter medication for pain except Tylenol   Do not take more than 3000mg (3 grams) of Tylenol in 24 hours.  Do not drink alcoholic beverages   Do not smoke   Do not drive until seen for follow up appointment   Do not place frozen gel pack directly on your skin. It can cause frostbite.  Do not take a tub bath, swim or get in a hot tub for 6 weeks  Prevention of falls and safety at home   Set up an area where you can rest comfortably leaving space around furniture to allow you to walk with your walker   Keep stairs, hallways and bathrooms well lit; especially at night   Arrange for care for your pets   Keep your home free of clutter      Call Dr. Jared Wade at [phone] for:   Pain that is not relieved by pain medication, ice and activity   Side effects of medications   Increased/spread of bruising   Warning signs of infection:  ? persistent fever greater than 100 degrees  ?  shaking or chills  ? increased redness, tenderness, swelling or drainage from incision  ? increased pain during activity or rest   Warning signs of a blood clot in your leg:  ? increased pain in your calf  ? tenderness or redness  ? increased swelling or knee, calf, ankle or foot    Call [phone] after 5pm or on a weekend.  The on call physician will return your phone call   Call your Primary Care Doctor for:    Concerns about your medical conditions such as diabetes, high blood pressure, asthma, congestive heart failure   Blood sugars greater than 180   Persistent headache or dizziness   Coughing or congestion   Constipation or diarrhea   Burning when you go to the bathroom   Abnormal heart rate (fast or slow)     Call 911 and go to the nearest hospital for:    Sudden increased shortness of breath   Sudden onset of chest pain   Difficulty breathing   Localized chest pain with coughing or taking a deep breath

## 2021-07-16 NOTE — ANESTHESIA PREPROCEDURE EVALUATION
Relevant Problems   No relevant active problems       Anesthetic History   No history of anesthetic complications            Review of Systems / Medical History  Patient summary reviewed, nursing notes reviewed and pertinent labs reviewed    Pulmonary  Within defined limits                 Neuro/Psych   Within defined limits           Cardiovascular  Within defined limits                     GI/Hepatic/Renal  Within defined limits              Endo/Other  Within defined limits      Arthritis     Other Findings              Physical Exam    Airway  Mallampati: II  TM Distance: > 6 cm  Neck ROM: normal range of motion   Mouth opening: Normal     Cardiovascular  Regular rate and rhythm,  S1 and S2 normal,  no murmur, click, rub, or gallop             Dental  No notable dental hx       Pulmonary  Breath sounds clear to auscultation               Abdominal  GI exam deferred       Other Findings            Anesthetic Plan    ASA: 1  Anesthesia type: spinal      Post-op pain plan if not by surgeon: peripheral nerve block single    Induction: Intravenous  Anesthetic plan and risks discussed with: Patient

## 2021-07-16 NOTE — ANESTHESIA POSTPROCEDURE EVALUATION
Procedure(s):  LEFT TOTAL KNEE ARTHROPLASTY. spinal, MAC    Anesthesia Post Evaluation        Patient location during evaluation: PACU  Patient participation: complete - patient participated  Level of consciousness: awake and alert  Pain management: adequate  Airway patency: patent  Anesthetic complications: no  Cardiovascular status: acceptable  Respiratory status: acceptable  Hydration status: acceptable  Comments: I have seen and evaluated the patient and is ready for discharge. Trung Harry MD    Post anesthesia nausea and vomiting:  none      INITIAL Post-op Vital signs:   Vitals Value Taken Time   /83 07/16/21 1230   Temp 36.6 °C (97.9 °F) 07/16/21 1145   Pulse 94 07/16/21 1237   Resp 15 07/16/21 1237   SpO2 96 % 07/16/21 1237   Vitals shown include unvalidated device data.

## 2021-07-16 NOTE — PROGRESS NOTES
Problem: Mobility Impaired (Adult and Pediatric)  Goal: *Acute Goals and Plan of Care (Insert Text)  Description: FUNCTIONAL STATUS PRIOR TO ADMISSION: Patient was independent and active without use of DME.    HOME SUPPORT PRIOR TO ADMISSION: The patient lived with spouse but did not require assist.    Physical Therapy Goals  Initiated 7/16/2021    1. Patient will move from supine to sit and sit to supine  and scoot up and down in bed with modified independence within 4 days. 2. Patient will perform sit to stand with modified independence within 4 days. 3. Patient will ambulate with modified independence for > 150 feet with the least restrictive device within 4 days. 4. Patient will ascend/descend 4 stairs with one handrail(s) with supervision/set-up within 4 days. 5. Patient will perform home exercise program per protocol with supervision/set-up within 4 days. 6. Patient will demonstrate AROM 0-90 degrees in operative joint within 4 days. PHYSICAL THERAPY EVALUATION  Patient: Purvi Santiago (37 y.o. female)  Date: 7/16/2021  Primary Diagnosis: Localized osteoarthritis of knees, bilateral [M17.0]  Procedure(s) (LRB):  LEFT TOTAL KNEE ARTHROPLASTY (Left) Day of Surgery   Precautions:   WBAT, Fall    ASSESSMENT  Based on the objective data described below, the patient presents POD#0 Left TKA with pain left knee, decreased AROM/strength and function left leg, decreased activity tolerance, decline in functional mobility, and impaired standing balance/gait with use of assistive device. Pt did well mobilizing OOB - amb in hallway with CGA only - however, upon returning pt's room - pt became diaphoretic and Bp initially low in supine. Anticipate pt will required 2-3 additional PT sessions to meet criteria for safe discharge from PT standpoint.     Current Level of Function Impacting Discharge (mobility/balance): Standby to CGA for functional mobility, decreased activity tolerance    Functional Outcome Measure: The patient scored 55/100 on the Barthel Index outcome measure . Other factors to consider for discharge:  to assist after discharge     Patient will benefit from skilled therapy intervention to address the above noted impairments. PLAN :  Recommendations and Planned Interventions: bed mobility training, transfer training, gait training, therapeutic exercises, patient and family training/education, and therapeutic activities      Frequency/Duration: Patient will be followed by physical therapy:  twice daily to address goals. Recommendation for discharge: (in order for the patient to meet his/her long term goals)  Physical therapy at least 2 days/week in the home     This discharge recommendation:  Has been made in collaboration with the attending provider and/or case management    IF patient discharges home will need the following DME: patient owns DME required for discharge         SUBJECTIVE:   Patient stated it just came on me so fast - I felt so funny.     OBJECTIVE DATA SUMMARY:   HISTORY:    Past Medical History:   Diagnosis Date    Arthritis     OSTEO-KNEES    COVID-19 vaccine series completed MODERNA    03/01/2021 AND 03/29/2021     Past Surgical History:   Procedure Laterality Date    HX ORTHOPAEDIC Right 07/02/2018    MENSICUIS REPAIR    HX TONSILLECTOMY         Personal factors and/or comorbidities impacting plan of care: as above    Home Situation  Home Environment: Private residence  # Steps to Enter: 3  Rails to Enter: Yes  Hand Rails : Bilateral  One/Two Story Residence: Two story  # of Interior Steps: [address]: Left  Living Alone: Yes  Support Systems: Family member(s)  Patient Expects to be Discharged to[de-identified] [employer]  Current DME Used/Available at Home: Destini Malena, rolling, Cane, straight, Raised toilet seat  Tub or Shower Type: Shower    EXAMINATION/PRESENTATION/DECISION MAKING:   Critical Behavior:  Neurologic State: Alert  Orientation Level: Oriented X4  Cognition: Appropriate for age attention/concentration     Skin:  Left leg covered with ace wrap - no drainage noted    Range Of Motion:  AROM: Within functional limits (except left knee)                       Strength:    Strength: Within functional limits (except left leg)                    Tone & Sensation:   Tone: Normal              Sensation: Intact               Coordination:  Coordination: Within functional limits  Functional Mobility:  Bed Mobility:     Supine to Sit: Stand-by assistance  Sit to Supine: Stand-by assistance  Scooting: Stand-by assistance (sitting edge of bed)  Transfers:  Sit to Stand: Contact guard assistance  Stand to Sit: Contact guard assistance                       Balance:   Sitting: Intact; Without support  Standing: Impaired; Without support  Standing - Static: Good;Constant support  Standing - Dynamic : Good;Constant support  Ambulation/Gait Training:  Distance (ft): 50 Feet (ft)  Assistive Device: Walker, rolling;Gait belt  Ambulation - Level of Assistance: Contact guard assistance        Gait Abnormalities: Antalgic;Decreased step clearance  Right Side Weight Bearing: Full  Left Side Weight Bearing: As tolerated  Base of Support: Center of gravity altered;Shift to right  Stance: Left decreased  Speed/Cheli: Slow  Step Length: Right shortened;Left shortened  Swing Pattern: Left asymmetrical                 Therapeutic Exercises:   Pt instructed in ankle pumps, quad sets, and heel slides - to perform 5-10 reps once hr when awake. Patient also instructed in use of incentive spirometer - x 10 reps once hr when awake. Functional Measure:  Barthel Index:    Bathin  Bladder: 10  Bowels: 10  Groomin  Dressin  Feeding: 10  Mobility: 0  Stairs: 0  Toilet Use: 5  Transfer (Bed to Chair and Back): 10  Total: 55/100       The Barthel ADL Index: Guidelines  1. The index should be used as a record of what a patient does, not as a record of what a patient could do.   2. The main aim is to establish degree of independence from any help, physical or verbal, however minor and for whatever reason. 3. The need for supervision renders the patient not independent. 4. A patient's performance should be established using the best available evidence. Asking the patient, friends/relatives and nurses are the usual sources, but direct observation and common sense are also important. However direct testing is not needed. 5. Usually the patient's performance over the preceding 24-48 hours is important, but occasionally longer periods will be relevant. 6. Middle categories imply that the patient supplies over 50 per cent of the effort. 7. Use of aids to be independent is allowed. Aliza Payne., Barthel, D.W. (3178). Functional evaluation: the Barthel Index. [address] [facility] (14)2. Tuan Hollis jarad MAX Houser, Kathaleen Boas., [facility]ena Mena., [place], [address] (1999). Measuring the change indisability after inpatient rehabilitation; comparison of the responsiveness of the Barthel Index and Functional [place] Measure. Journal of Neurology, Neurosurgery, and Psychiatry, 66(4), 201-981. Onel Ferrell, N.J.A, MARIZA Tejeda, & Jannet Romano, MAlfreditoA. (2004.) Assessment of post-stroke quality of life in cost-effectiveness studies: The usefulness of the Barthel Index and the EuroQoL-5D.  Quality of Life Research, 15, 266-27           Physical Therapy Evaluation Charge Determination   History Examination Presentation Decision-Making   LOW Complexity : Zero comorbidities / personal factors that will impact the outcome / POC LOW Complexity : 1-2 Standardized tests and measures addressing body structure, function, activity limitation and / or participation in recreation  LOW Complexity : Stable, uncomplicated  LOW Complexity : FOTO score of       Based on the above components, the patient evaluation is determined to be of the following complexity level: LOW     Pain Rating:  Left knee 3/10    Activity Tolerance:   Fair - after amb short distance - pt became diaphoretic, c/o feeling funny, nausea - pt assisted back to bed - Bp low in supine    Vitals:    07/16/21 1517 07/16/21 1525 07/16/21 1605 07/16/21 1651   BP: 106/66 121/80 (!) 145/68 (!) 142/85   BP 1 Location:   Right upper arm Right arm   BP Patient Position: Supine  Comment: after walking becoming nauseated Supine  Comment: HOB elevated At rest    Pulse: (!) 101 (!) 105 99 98        After treatment patient left in no apparent distress:   Supine in bed and Call bell within reach    COMMUNICATION/EDUCATION:   The patients plan of care was discussed with: Registered nurse. Fall prevention education was provided and the patient/caregiver indicated understanding., Patient/family have participated as able in goal setting and plan of care. , and Patient/family agree to work toward stated goals and plan of care.     Thank you for this referral.  Roxana Saint, PT   Time Calculation: 30 mins

## 2021-07-16 NOTE — ANESTHESIA PROCEDURE NOTES
Peripheral Block    Start time: 7/16/2021 8:00 AM  End time: 7/16/2021 8:02 AM  Performed by: Claude Cardona MD  Authorized by: Claude Cardona MD       Pre-procedure: Indications: at surgeon's request and post-op pain management    Preanesthetic Checklist: patient identified, risks and benefits discussed, site marked, timeout performed and patient being monitored    Timeout Time: 08:00 EDT          Block Type:   Block Type:   Adductor canal  Laterality:  Left  Monitoring:  Standard ASA monitoring, continuous pulse ox, frequent vital sign checks, heart rate, responsive to questions and oxygen  Injection Technique:  Single shot  Procedures: ultrasound guided    Patient Position: supine  Prep: DuraPrep    Needle Type:  Stimuplex  Needle Gauge:  22 G  Needle Localization:  Ultrasound guidance  Medication Injected:  Ropivacaine (PF) (NAROPIN)(0.5%) 5 mg/mL injection, 30 mL  Med Admin Time: 7/16/2021 8:02 AM    Assessment:  Number of attempts:  1  Injection Assessment:  Incremental injection every 5 mL, local visualized surrounding nerve on ultrasound, negative aspiration for blood, no paresthesia and no intravascular symptoms  Patient tolerance:  Patient tolerated the procedure well with no immediate complications

## 2021-07-17 LAB
ANION GAP SERPL CALC-SCNC: 6 MMOL/L (ref 5–15)
BUN SERPL-MCNC: 15 MG/DL (ref 6–20)
BUN/CREAT SERPL: 14 (ref 12–20)
CALCIUM SERPL-MCNC: 8.1 MG/DL (ref 8.5–10.1)
CHLORIDE SERPL-SCNC: 107 MMOL/L (ref 97–108)
CO2 SERPL-SCNC: 26 MMOL/L (ref 21–32)
CREAT SERPL-MCNC: 1.04 MG/DL (ref 0.55–1.02)
GLUCOSE SERPL-MCNC: 113 MG/DL (ref 65–100)
HGB BLD-MCNC: 10.1 G/DL (ref 11.5–16)
POTASSIUM SERPL-SCNC: 3.9 MMOL/L (ref 3.5–5.1)
SODIUM SERPL-SCNC: 139 MMOL/L (ref 136–145)

## 2021-07-17 PROCEDURE — 85018 HEMOGLOBIN: CPT

## 2021-07-17 PROCEDURE — 80048 BASIC METABOLIC PNL TOTAL CA: CPT

## 2021-07-17 PROCEDURE — 74011000250 HC RX REV CODE- 250: Performed by: PHYSICIAN ASSISTANT

## 2021-07-17 PROCEDURE — 74011250637 HC RX REV CODE- 250/637: Performed by: PHYSICIAN ASSISTANT

## 2021-07-17 PROCEDURE — 36415 COLL VENOUS BLD VENIPUNCTURE: CPT

## 2021-07-17 PROCEDURE — 97530 THERAPEUTIC ACTIVITIES: CPT

## 2021-07-17 PROCEDURE — 74011250636 HC RX REV CODE- 250/636: Performed by: PHYSICIAN ASSISTANT

## 2021-07-17 PROCEDURE — 97116 GAIT TRAINING THERAPY: CPT

## 2021-07-17 PROCEDURE — 97110 THERAPEUTIC EXERCISES: CPT

## 2021-07-17 PROCEDURE — 97165 OT EVAL LOW COMPLEX 30 MIN: CPT

## 2021-07-17 RX ADMIN — ACETAMINOPHEN 1000 MG: 500 TABLET ORAL at 06:13

## 2021-07-17 RX ADMIN — AZELASTINE HYDROCHLORIDE, FLUTICASONE PROPIONATE 2 PUFF: 137; 50 SPRAY, METERED NASAL at 21:00

## 2021-07-17 RX ADMIN — SODIUM CHLORIDE 125 ML/HR: 900 INJECTION, SOLUTION INTRAVENOUS at 03:48

## 2021-07-17 RX ADMIN — AZELASTINE HYDROCHLORIDE, FLUTICASONE PROPIONATE 2 PUFF: 137; 50 SPRAY, METERED NASAL at 09:25

## 2021-07-17 RX ADMIN — ASPIRIN 325 MG: 325 TABLET, COATED ORAL at 09:24

## 2021-07-17 RX ADMIN — OXYCODONE HYDROCHLORIDE 10 MG: 5 TABLET ORAL at 16:33

## 2021-07-17 RX ADMIN — DOCUSATE SODIUM 50 MG AND SENNOSIDES 8.6 MG 1 TABLET: 8.6; 5 TABLET, FILM COATED ORAL at 09:24

## 2021-07-17 RX ADMIN — ACETAMINOPHEN 1000 MG: 500 TABLET ORAL at 16:33

## 2021-07-17 RX ADMIN — MUPIROCIN: 20 OINTMENT TOPICAL at 10:38

## 2021-07-17 RX ADMIN — OXYCODONE HYDROCHLORIDE 5 MG: 5 TABLET ORAL at 00:50

## 2021-07-17 RX ADMIN — OXYCODONE HYDROCHLORIDE 10 MG: 5 TABLET ORAL at 09:24

## 2021-07-17 RX ADMIN — OXYCODONE HYDROCHLORIDE 10 MG: 5 TABLET ORAL at 12:57

## 2021-07-17 RX ADMIN — MUPIROCIN: 20 OINTMENT TOPICAL at 19:25

## 2021-07-17 RX ADMIN — DOCUSATE SODIUM 50 MG AND SENNOSIDES 8.6 MG 1 TABLET: 8.6; 5 TABLET, FILM COATED ORAL at 18:04

## 2021-07-17 RX ADMIN — CEFAZOLIN SODIUM 2 G: 1 INJECTION, POWDER, FOR SOLUTION INTRAMUSCULAR; INTRAVENOUS at 00:50

## 2021-07-17 RX ADMIN — Medication 10 ML: at 14:47

## 2021-07-17 RX ADMIN — OXYCODONE HYDROCHLORIDE 10 MG: 5 TABLET ORAL at 19:31

## 2021-07-17 RX ADMIN — PANTOPRAZOLE SODIUM 40 MG: 40 TABLET, DELAYED RELEASE ORAL at 06:13

## 2021-07-17 RX ADMIN — POLYETHYLENE GLYCOL 3350 17 G: 17 POWDER, FOR SOLUTION ORAL at 09:24

## 2021-07-17 RX ADMIN — ACETAMINOPHEN 1000 MG: 500 TABLET ORAL at 09:24

## 2021-07-17 RX ADMIN — ASPIRIN 325 MG: 325 TABLET, COATED ORAL at 18:04

## 2021-07-17 RX ADMIN — OXYCODONE HYDROCHLORIDE 5 MG: 5 TABLET ORAL at 06:13

## 2021-07-17 NOTE — PROGRESS NOTES
Transition of Care:  Anticipate discharge tomorrow with Silvia Fuchs [address] ([phone])  Referral sent via [employer] and accepted. Cm entered [facility] information on patients After Visit Summary     Cm met w/ the patient, introduced self, explained role and offered support. Patient lives alone but has 2 daughters that live near her. Her daughter from [address] [facility] will be staying with her a few days when she is discharged. Patient expressed no concerns with needing DME. Patient was independent w/ adls and iadls prior to admission. Patients daughter will be providing transportation home     Reason for Admission:  Left Total Knee Arthroplasty                     RUR Score:  N/A                   Plan for utilizing home health:    [address] [facility]      PCP: First and Last name:  Bryanna Sesay MD   Are you a current patient: Yes/No: Yes   Approximate date of last visit: 3-4 mths ago   Can you participate in a virtual visit with your PCP: No                    Current Advanced Directive/Advance Care Plan: Full Code      Healthcare Decision Maker:   Click here to complete [address] including selection of the Healthcare Decision Maker Relationship (ie \"Primary\")              The Plan for Transition of Care is related to the following treatment goals: PT    The Patient  was provided with a choice of provider and agrees   with the discharge plan. [x] Yes [] No    Freedom of choice list was provided with basic dialogue that supports the patient's individualized plan of care/goals, treatment preferences and shares the quality data associated with the providers. [x] Yes [] No      Observation notice provided in writing to patient and/or caregiver as well as verbal explanation of the policy. Patients who are in outpatient status also receive the Observation notice.   Patient has received notice and or patient representative has received via secure email, fax, or certified mail based on patient representative's preference.

## 2021-07-17 NOTE — PROGRESS NOTES
Problem: Mobility Impaired (Adult and Pediatric)  Goal: *Acute Goals and Plan of Care (Insert Text)  Description: FUNCTIONAL STATUS PRIOR TO ADMISSION: Patient was independent and active without use of DME.    HOME SUPPORT PRIOR TO ADMISSION: The patient lived with spouse but did not require assist.    Physical Therapy Goals  Initiated 7/16/2021    1. Patient will move from supine to sit and sit to supine  and scoot up and down in bed with modified independence within 4 days. 2. Patient will perform sit to stand with modified independence within 4 days. 3. Patient will ambulate with modified independence for > 150 feet with the least restrictive device within 4 days. 4. Patient will ascend/descend 4 stairs with one handrail(s) with supervision/set-up within 4 days. 5. Patient will perform home exercise program per protocol with supervision/set-up within 4 days. 6. Patient will demonstrate AROM 0-90 degrees in operative joint within 4 days. Outcome: Progressing Towards Goal   PHYSICAL THERAPY TREATMENT  Patient: Inna Saravia (83 y.o. female)  Date: 7/17/2021  Diagnosis: Localized osteoarthritis of knees, bilateral [M17.0] <principal problem not specified>  Procedure(s) (LRB):  LEFT TOTAL KNEE ARTHROPLASTY (Left) 1 Day Post-Op  Precautions: WBAT, Fall  Chart, physical therapy assessment, plan of care and goals were reviewed. ASSESSMENT  Patient continues with skilled PT services and is progressing towards goals. Pt cleared by nsg  seen with OT due to previous session having orthostatic Hypotension. Pt mobilized well to sit edge of bed with only S.  BP supine was a bit low, but improved once sitting edge of bed. Pt able to stand with min assist and walk to just past doorway before complaining of \"not feeling good\", becoming pale. Pt returned to sit in chair at bedside with BP now 102./64 and diaphoretic. Pt returned to bed with mod assist of 2, HOB elevated, BP did not increased so HOB flat.   Please refer to docflow sheets for vitals taken during session. Pt mobilized well and had completed exercised in bed prior to mobilizing. Pt progressing well with mobility but limited by [facility] and pain. Current Level of Function Impacting Discharge (mobility/balance): pain and OH limiting progress, but moving well    Other factors to consider for discharge: 3 A[address]INA and lives alone but family can stay and assist as needed         PLAN :  Patient continues to benefit from skilled intervention to address the above impairments. Continue treatment per established plan of care. to address goals. Recommendation for discharge: (in order for the patient to meet his/her long term goals)  Physical therapy at least 2 days/week in the home     This discharge recommendation:  Has been made in collaboration with the attending provider and/or case management    IF patient discharges home will need the following DME: patient owns DME required for discharge       SUBJECTIVE:   Patient stated I don't know why this is happening.     OBJECTIVE DATA SUMMARY:   Critical Behavior:  Neurologic State: Alert, Appropriate for age  Orientation Level: Oriented X4  Cognition: Appropriate decision making, Appropriate for age attention/concentration, Appropriate safety awareness, Follows commands       Range of Motion:                            Functional Mobility Training:  Bed Mobility:     Supine to Sit: Supervision  Sit to Supine: Supervision  Scooting: Supervision        Transfers:  Sit to Stand: Minimum assistance  Stand to Sit: Contact guard assistance; Other (comment) (verbal cues for safety)                             Balance:  Sitting: Intact; Without support  Standing: Impaired  Standing - Static: Constant support;Good  Standing - Dynamic : Constant support;Good  Ambulation/Gait Training:  Distance (ft): 20 Feet (ft) (20)  Assistive Device: Gait belt;Walker, rolling  Ambulation - Level of Assistance: Contact guard assistance        Gait Abnormalities: Antalgic;Decreased step clearance     Left Side Weight Bearing: As tolerated  Base of Support: Center of gravity altered;Shift to right;Widened  Stance: Left decreased  Speed/Cheli: Slow  Step Length: Right lengthened;Left shortened                  Therapeutic Exercises:     EXERCISE   Sets   Reps   Active Active Assist   Passive Self ROM   Comments   Ankle Pumps 1 10 [x]                                        []                                        []                                        []                                           Quad Sets 1 5 [x]                                        []                                        []                                        []                                           Hamstring Sets 1 3 [x]                                        []                                        []                                        []                                           Short Arc Quads   []                                        []                                        []                                        []                                           Knee Extension Stretch     []                                          []                                          []                                          []                                           Heel Slides   []                                        []                                        []                                        []                                           Long Arc Quads   []                                        []                                        []                                        []                                           Knee Flexion Stretch   []                                        []                                        []                                        []                                           Straight Leg Raises   [] []                                        []                                        []                                               Pain Ratin/10 but pain meds due soon per RN    Activity Tolerance:   Fair limited by pain and orthostasis      After treatment patient left in no apparent distress:   Supine in bed, Call bell within reach, and Side rails x 3    COMMUNICATION/COLLABORATION:   The patients plan of care was discussed with: Registered nurse.      Corie Epps, PT   Time Calculation: 35 mins

## 2021-07-17 NOTE — PROGRESS NOTES
Problem: Falls - Risk of  Goal: *Absence of Falls  Description: Document Hina Collado Fall Risk and appropriate interventions in the flowsheet.   Outcome: Progressing Towards Goal  Note: Fall Risk Interventions:  Mobility Interventions: Patient to call before getting OOB, PT Consult for mobility concerns    Mentation Interventions: Gait belt with transfers/ambulation, Evaluate medications/consider consulting pharmacy    Medication Interventions: Patient to call before getting OOB    Elimination Interventions: Call light in reach

## 2021-07-17 NOTE — PROGRESS NOTES
Problem: Mobility Impaired (Adult and Pediatric)  Goal: *Acute Goals and Plan of Care (Insert Text)  Description: FUNCTIONAL STATUS PRIOR TO ADMISSION: Patient was independent and active without use of DME.    HOME SUPPORT PRIOR TO ADMISSION: The patient lived with spouse but did not require assist.    Physical Therapy Goals  Initiated 7/16/2021    1. Patient will move from supine to sit and sit to supine  and scoot up and down in bed with modified independence within 4 days. 2. Patient will perform sit to stand with modified independence within 4 days. 3. Patient will ambulate with modified independence for > 150 feet with the least restrictive device within 4 days. 4. Patient will ascend/descend 4 stairs with one handrail(s) with supervision/set-up within 4 days. 5. Patient will perform home exercise program per protocol with supervision/set-up within 4 days. 6. Patient will demonstrate AROM 0-90 degrees in operative joint within 4 days. 7/17/2021 0930 by Carolynne Severance, PT  Outcome: Progressing Towards Goal     PHYSICAL THERAPY TREATMENT  Patient: Russel Reis (29 y.o. female)  Date: 7/17/2021  Diagnosis: Localized osteoarthritis of knees, bilateral [M17.0] <principal problem not specified>  Procedure(s) (LRB):  LEFT TOTAL KNEE ARTHROPLASTY (Left) 1 Day Post-Op  Precautions: WBAT, Fall  Chart, physical therapy assessment, plan of care and goals were reviewed. ASSESSMENT  Patient continues with skilled PT services and is progressing towards goals. Pt continues to have issues with pain and decreased BP. Pt with starting BP supine of 112/65, then 109/ 73 in standing after slow ascent . Performed bed exercises (see below) and family education on all. Pt stood with min assist and took 4 steps up to head of bed, \"marching\" in place x20 steps. Pt did become pale but BP stayed stable. Returned to bed. Encouraged to do ankle pumps. Will follow.       Current Level of Function Impacting Discharge (mobility/balance): pt mobility is progressing but limited by decreased blood pressure, and to lesser degree, pain. Other factors to consider for discharge: pt has help from family and friends for home         PLAN :  Patient continues to benefit from skilled intervention to address the above impairments. Continue treatment per established plan of care. to address goals. Recommendation for discharge: (in order for the patient to meet his/her long term goals)  Physical therapy at least 2 days/week in the home     This discharge recommendation:  Has been made in collaboration with the attending provider and/or case management    IF patient discharges home will need the following DME: patient owns DME required for discharge       SUBJECTIVE:   Patient stated I feel like I am not doing enough.     OBJECTIVE DATA SUMMARY:   Critical Behavior:  Neurologic State: Appropriate for age, Alert  Orientation Level: Oriented X4  Cognition: Appropriate decision making, Appropriate for age attention/concentration, Appropriate safety awareness, Follows commands       Range of Motion:  AROM: Within functional limits (except L knee)              Functional Mobility Training:  Bed Mobility:     Supine to Sit: Supervision  Sit to Supine: Supervision  Scooting: Supervision        Transfers:  Sit to Stand: Minimum assistance  Stand to Sit: Contact guard assistance; Other (comment) (verbal cues for safety)                             Balance:  Sitting: Intact; Without support  Standing: Impaired  Standing - Static: Constant support;Good  Standing - Dynamic : Constant support;Good  Ambulation/Gait Training:  Distance (ft): 20 Feet (ft) (20)  Assistive Device: Gait belt;Walker, rolling  Ambulation - Level of Assistance: Contact guard assistance        Gait Abnormalities: Antalgic;Decreased step clearance     Left Side Weight Bearing: As tolerated  Base of Support: Center of gravity altered;Shift to right;Widened  Stance: Left decreased  Speed/Cheli: Slow  Step Length: Right lengthened;Left shortened               Therapeutic Exercises:     EXERCISE   Sets   Reps   Active Active Assist   Passive Self ROM   Comments   Ankle Pumps 1 10 [x]                                        []                                        []                                        []                                           Quad Sets 1 5 []                                        []                                        []                                        []                                           Hamstring Sets 1 5                                         []                                        []                                        []                                           Short Arc Quads   []                                        [x]                                        []                                        []                                           Knee Extension Stretch     []                                          []                                          []                                          []                                           Heel Slides 1 10 []                                        [x]                                        []                                        []                                        Seated and supine   Long Arc Quads 1 5 []                                        [x]                                        []                                        []                                           Knee Flexion Stretch   []                                        []                                        []                                        []                                           Straight Leg Raises  3 []                                        [x]                                        []                                        []                                             Family educated on all exercises in bed and sitting up  Pain Ratin    Activity Tolerance:   Fair limited by decr BP    After treatment patient left in no apparent distress:   Supine in bed, Call bell within reach, and Caregiver / family present    COMMUNICATION/COLLABORATION:   The patients plan of care was discussed with: Registered nurse.      Lashay Kelly, PT   Time Calculation: 30 mins

## 2021-07-17 NOTE — PROGRESS NOTES
Bedside and Verbal shift change report given to Sabi Jamison RN (oncoming nurse) by Maciej Snyder RN (offgoing nurse). Report included the following information SBAR, Kardex, Procedure Summary, Intake/Output, MAR and Recent Results.

## 2021-07-17 NOTE — PROGRESS NOTES
Orders received, chart reviewed and patient evaluated by occupational therapy. Pending progression with skilled acute occupational therapy, recommend: To be determined: likely HHPT once medical issues resolve     Recommend with nursing ADLs with supervision/setup, OOB to chair 3x/day and toileting via beside commode 1 assist and walker. Thank you for completing as able in order to maintain patient strength, endurance and independence. Full evaluation to follow.

## 2021-07-17 NOTE — PROGRESS NOTES
Problem: Self Care Deficits Care Plan (Adult)  Goal: *Acute Goals and Plan of Care (Insert Text)  Description: FUNCTIONAL STATUS PRIOR TO ADMISSION: Patient was independent and active without use of DME. Mod I for ADLs    HOME SUPPORT PRIOR TO ADMISSION: The patient lived with spouse but did not require assist.       Occupational Therapy Goals  Initiated 7/17/2021  1. Patient will perform lower body ADLs with supervision/set-up within 4 day(s). 2.  Patient will perform upper body ADLs standing 5 mins without fatigue or LOB with supervision/set-up within 4 day(s). 3.  Patient will perform all aspects of toileting with supervision/set-up within 4 day(s). 4.  Patient will participate in upper extremity therapeutic exercise/activities with supervision/set-up for 10 minutes within 4 day(s). 5.  Patient will utilize energy conservation techniques during functional activities without cues within 4 day(s). Outcome: Progressing Towards Goal       OCCUPATIONAL THERAPY EVALUATION  Patient: Theodore Cortes (40 y.o. female)  Date: 7/17/2021  Primary Diagnosis: Localized osteoarthritis of knees, bilateral [M17.0]  Procedure(s) (LRB):  LEFT TOTAL KNEE ARTHROPLASTY (Left) 1 Day Post-Op   Precautions:   WBAT, Fall    ASSESSMENT  Based on the objective data described below, the patient presents with symptomatic orthostatic hypotension, decreased functional reach for LB dressing L LE, decreased balance, and decline in functional status s/p POD 1 L TKA. Pt was supervision for supine to sit, min A to stand from EOB with VC for hand placement. With short distance functional mobility (10 ft), pt reported not feeling well with noted diaphoresis and pale face. Assisted pt to chair with pt orthostatic (SBP from 137 supine to 102 sitting post act). PT assisted with returning supine and RN notified. Evaluation limited by above, but expect pt to progress well with therapy once BP issues resolve.             Current Level of Function Impacting Discharge (ADLs/self-care): min A to stand, setup to mod A ADLs    Functional Outcome Measure: The patient scored 55 on the barthel outcome measure which is indicative of 45%. Other factors to consider for discharge: lives at home with spouse     Patient will benefit from skilled therapy intervention to address the above noted impairments. PLAN :  Recommendations and Planned Interventions: self care training, functional mobility training, therapeutic exercise, balance training, therapeutic activities, endurance activities, and patient education    Frequency/Duration: Patient will be followed by occupational therapy 5 times a week to address goals. Recommendation for discharge: (in order for the patient to meet his/her long term goals)  To be determined: likely no HHOT needs once BP issues resolve. This discharge recommendation:  Has not yet been discussed the attending provider and/or case management    IF patient discharges home will need the following DME: none, has all needed DME       SUBJECTIVE:   Patient stated I felt a little worse earlier on today than I did yesterday.     OBJECTIVE DATA SUMMARY:   HISTORY:   Past Medical History:   Diagnosis Date    Arthritis     OSTEO-KNEES    COVID-19 vaccine series completed MODERNA    03/01/2021 AND 03/29/2021     Past Surgical History:   Procedure Laterality Date    HX ORTHOPAEDIC Right 07/02/2018    MENSICUIS REPAIR    HX TONSILLECTOMY         Expanded or extensive additional review of patient history:     Home Situation  Home Environment: Private residence  # Steps to Enter: 3  Rails to Enter: Yes  Hand Rails : Bilateral  One/Two Story Residence: Two story  # of Interior Steps: 15  Interior Rails: Left  Living Alone: Yes  Support Systems: Family member(s)  Patient Expects to be Discharged toVF Cor[de-identified]ration  Current DME Used/Available at Home: Walker, rolling, Cane, straight, Raised toilet seat  Tub or Shower Type: Shower    Hand dominance: Right    EXAMINATION OF PERFORMANCE DEFICITS:  Cognitive/Behavioral Status:  Neurologic State: Alert; Appropriate for age  Orientation Level: Oriented X4  Cognition: Appropriate decision making; Appropriate for age attention/concentration; Appropriate safety awareness; Follows commands             Skin: L knee    Edema: L knee    Hearing:       Vision/Perceptual:                           Acuity: Within Defined Limits         Range of Motion:    AROM: Within functional limits (except L knee)                         Strength:    Strength: Within functional limits (except L knee)                Coordination:  Coordination: Within functional limits  Fine Motor Skills-Upper: Left Intact; Right Intact    Gross Motor Skills-Upper: Left Intact; Right Intact    Tone & Sensation:    Tone: Normal  Sensation: Intact                      Balance:  Sitting: Intact; Without support  Standing: Impaired  Standing - Static: Constant support;Good  Standing - Dynamic : Constant support;Good    Functional Mobility and Transfers for ADLs:  Bed Mobility:  Supine to Sit: Supervision  Sit to Supine: Supervision  Scooting: Supervision    Transfers:  Sit to Stand: Minimum assistance  Stand to Sit: Contact guard assistance; Other (comment) (verbal cues for safety)  Toilet Transfer : Minimum assistance; Additional time;Assist x1 (inferred by chair transfer, BSC due to hypotension )    ADL Assessment:  Feeding: Independent    Oral Facial Hygiene/Grooming: Setup (seated)    Bathing: Minimum assistance    Upper Body Dressing: Setup    Lower Body Dressing:  Moderate assistance    Toileting: Minimum assistance                ADL Intervention and task modifications:                                          Pt educated on role of OT and required verbal cues for safety and proper hand placement during ADLs/functional transfers. '      Functional Measure:  Barthel Index:    Bathin  Bladder: 10  Bowels: 10  Groomin  Dressin  Feeding: 10  Mobility: 0  Stairs: 0  Toilet Use: 5  Transfer (Bed to Chair and Back): 10  Total: 55/100        The Barthel ADL Index: Guidelines  1. The index should be used as a record of what a patient does, not as a record of what a patient could do. 2. The main aim is to establish degree of independence from any help, physical or verbal, however minor and for whatever reason. 3. The need for supervision renders the patient not independent. 4. A patient's performance should be established using the best available evidence. Asking the patient, friends/relatives and nurses are the usual sources, but direct observation and common sense are also important. However direct testing is not needed. 5. Usually the patient's performance over the preceding 24-48 hours is important, but occasionally longer periods will be relevant. 6. Middle categories imply that the patient supplies over 50 per cent of the effort. 7. Use of aids to be independent is allowed. Tanna Abraham., Barthel, DAlfreditoW. (4724). Functional evaluation: the Barthel Index. [address] [facility] [employer] (14)2. SHELBY HooperF, Juan Daniel Knight., Rob Carrero., [facility], [address] [place] [address] (1999). Measuring the change indisability after inpatient rehabilitation; comparison of the responsiveness of the Barthel Index and Functional Hernando Measure. Journal of Neurology, Neurosurgery, and Psychiatry, 66(4), 473-073. CHANDLER Pradhan.JUAN MANUEL, MARIZA Tejeda, & Gregg Bello MAlfreditoA. (2004.) Assessment of post-stroke quality of life in cost-effectiveness studies: The usefulness of the Barthel Index and the EuroQoL-5D.  Quality of Life Research, 15, 623-12         Occupational Therapy Evaluation Charge Determination   History Examination Decision-Making   LOW Complexity : Brief history review  MEDIUM Complexity : 3-5 performance deficits relating to physical, cognitive , or psychosocial skils that result in activity limitations and / or participation restrictions LOW Complexity : No comorbidities that affect functional and no verbal or physical assistance needed to complete eval tasks       Based on the above components, the patient evaluation is determined to be of the following complexity level: LOW   Pain Rating:  L knee    Activity Tolerance:   Fair and signs and symptoms of orthostatic hypotension    After treatment patient left in no apparent distress:    Supine in bed and Call bell within reach    COMMUNICATION/EDUCATION:   The patients plan of care was discussed with: Physical therapist and Registered nurse. Patient/family have participated as able in goal setting and plan of care. This patients plan of care is appropriate for delegation to \[facility] [place] [facility]\"".     Thank you for this referral.  Artis Marie OT  Time Calculation: 36 mins

## 2021-07-17 NOTE — PROGRESS NOTES
Bedside and Verbal shift change report given to [address] [place] [address] Cody (oncoming nurse) by Raymond Oakley (offgoing nurse). Report included the following information SBAR.

## 2021-07-17 NOTE — PROGRESS NOTES
POD 1 Day Post-Op    Procedure:  Procedure(s):  LEFT TOTAL KNEE ARTHROPLASTY    Subjective:     Patient has complaints of pain. Denies numbness or tingling. Objective:     Blood pressure 104/63, pulse 92, temperature 98.2 °F (36.8 °C), resp. rate 20, weight 83.9 kg (185 lb), SpO2 95 %. Temp (24hrs), Av.9 °F (36.6 °C), Min:97.4 °F (36.3 °C), Max:98.6 °F (37 °C)      Physical Exam:  Examination of the left knee reveals that the dressing is clean and intact. Sensation is intact to light touch. Brisk capillary refill. +EHL/DF/PF    Labs:   Lab Results   Component Value Date/Time    HGB 10.1 (L) 2021 12:58 AM         Assessment:     Active Problems:    Primary osteoarthritis of left knee (2021)        Plan/Recommendations/Medical Decision Making:     Continue physical therapy  Ice and elevate  Begin discharge planning--likely home tomorrow.

## 2021-07-18 VITALS
BODY MASS INDEX: 33.84 KG/M2 | DIASTOLIC BLOOD PRESSURE: 68 MMHG | RESPIRATION RATE: 16 BRPM | OXYGEN SATURATION: 95 % | SYSTOLIC BLOOD PRESSURE: 116 MMHG | HEART RATE: 87 BPM | TEMPERATURE: 98.9 F | WEIGHT: 185 LBS

## 2021-07-18 PROCEDURE — 97116 GAIT TRAINING THERAPY: CPT

## 2021-07-18 PROCEDURE — 74011250637 HC RX REV CODE- 250/637: Performed by: PHYSICIAN ASSISTANT

## 2021-07-18 PROCEDURE — 97530 THERAPEUTIC ACTIVITIES: CPT

## 2021-07-18 RX ADMIN — OXYCODONE HYDROCHLORIDE 10 MG: 5 TABLET ORAL at 15:05

## 2021-07-18 RX ADMIN — ACETAMINOPHEN 1000 MG: 500 TABLET ORAL at 00:17

## 2021-07-18 RX ADMIN — DOCUSATE SODIUM 50 MG AND SENNOSIDES 8.6 MG 1 TABLET: 8.6; 5 TABLET, FILM COATED ORAL at 09:59

## 2021-07-18 RX ADMIN — PANTOPRAZOLE SODIUM 40 MG: 40 TABLET, DELAYED RELEASE ORAL at 07:40

## 2021-07-18 RX ADMIN — Medication 10 ML: at 07:41

## 2021-07-18 RX ADMIN — Medication 10 ML: at 00:22

## 2021-07-18 RX ADMIN — ACETAMINOPHEN 1000 MG: 500 TABLET ORAL at 11:47

## 2021-07-18 RX ADMIN — Medication 10 ML: at 15:03

## 2021-07-18 RX ADMIN — AZELASTINE HYDROCHLORIDE, FLUTICASONE PROPIONATE 2 PUFF: 137; 50 SPRAY, METERED NASAL at 09:59

## 2021-07-18 RX ADMIN — ACETAMINOPHEN 1000 MG: 500 TABLET ORAL at 06:30

## 2021-07-18 RX ADMIN — MUPIROCIN: 20 OINTMENT TOPICAL at 10:00

## 2021-07-18 RX ADMIN — POLYETHYLENE GLYCOL 3350 17 G: 17 POWDER, FOR SOLUTION ORAL at 09:59

## 2021-07-18 RX ADMIN — OXYCODONE HYDROCHLORIDE 10 MG: 5 TABLET ORAL at 11:47

## 2021-07-18 RX ADMIN — OXYCODONE HYDROCHLORIDE 10 MG: 5 TABLET ORAL at 00:17

## 2021-07-18 RX ADMIN — ASPIRIN 325 MG: 325 TABLET, COATED ORAL at 09:59

## 2021-07-18 RX ADMIN — OXYCODONE HYDROCHLORIDE 10 MG: 5 TABLET ORAL at 07:40

## 2021-07-18 NOTE — PROGRESS NOTES
Patient resting quietly in bed, continues to rate pain 9/10. NAD noted. Will continue to monitor. PRN pain medication administered. Call bell within reach.

## 2021-07-18 NOTE — PROGRESS NOTES
Problem: Mobility Impaired (Adult and Pediatric)  Goal: *Acute Goals and Plan of Care (Insert Text)  Description: FUNCTIONAL STATUS PRIOR TO ADMISSION: Patient was independent and active without use of DME.    HOME SUPPORT PRIOR TO ADMISSION: The patient lived with spouse but did not require assist.    Physical Therapy Goals  Initiated 7/16/2021    1. Patient will move from supine to sit and sit to supine  and scoot up and down in bed with modified independence within 4 days. 2. Patient will perform sit to stand with modified independence within 4 days. 3. Patient will ambulate with modified independence for > 150 feet with the least restrictive device within 4 days. 4. Patient will ascend/descend 4 stairs with one handrail(s) with supervision/set-up within 4 days. 5. Patient will perform home exercise program per protocol with supervision/set-up within 4 days. 6. Patient will demonstrate AROM 0-90 degrees in operative joint within 4 days. 7/18/2021 1437 by Darylene Mend, PT  Outcome: Progressing Towards Goal  7/18/2021 0953 by Darylene Mend, PT  Outcome: Progressing Towards Goal   PHYSICAL THERAPY TREATMENT  Patient: Vee Blake (4 y.o. female)  Date: 7/18/2021  Diagnosis: Localized osteoarthritis of knees, bilateral [M17.0] <principal problem not specified>  Procedure(s) (LRB):  LEFT TOTAL KNEE ARTHROPLASTY (Left) 2 Days Post-Op  Precautions: WBAT, Fall  Chart, physical therapy assessment, plan of care and goals were reviewed. ASSESSMENT  Patient continues with skilled PT services and is progressing towards goals. Pt demo increased tolerance to upright mobility training this session. Noted SBP drop after activity, however MAP stable and pt without associated symptoms. Tolerated household distance gait with RW x 2 trials, stair training with bilat HR and step-to pattern, CGA for balance. Pt notes plan to stay on first floor of home at initial d/c, family to stay 24/7 as needed.  From mobility standpoint, pt clear for d/c home with follow up [facility] PT.     Current Level of Function Impacting Discharge (mobility/balance): bed mob SBA, sit to stand SBA, amb with RW SBA, stair negotiation with bilat HR CGA    Other factors to consider for discharge: excellent family support, temporary first floor set up available         PLAN :  Patient continues to benefit from skilled intervention to address the above impairments. Continue treatment per established plan of care. to address goals. Recommendation for discharge: (in order for the patient to meet his/her long term goals)  Physical therapy at least 2 days/week in the home     This discharge recommendation:  Has been made in collaboration with the attending provider and/or case management    IF patient discharges home will need the following DME: patient owns DME required for discharge       SUBJECTIVE:   Patient stated I'm tired in general, but not like earlier when I walked.     OBJECTIVE DATA SUMMARY:   Critical Behavior:  Neurologic State: Alert, Appropriate for age  Orientation Level: Appropriate for age, Oriented to place, Oriented to time, Oriented to situation  Cognition: Appropriate decision making, Appropriate for age attention/concentration, Appropriate safety awareness, Follows commands     Functional Mobility Training:  Bed Mobility:     Supine to Sit: Supervision  Sit to Supine: Stand-by assistance  Scooting: Supervision        Transfers:  Sit to Stand: Stand-by assistance  Stand to Sit: Supervision (cues for reaching back)                             Balance:  Sitting: Intact  Standing: Intact; With support  Ambulation/Gait Training:  Distance (ft): 70 Feet (ft)  Assistive Device: Gait belt;Walker, rolling  Ambulation - Level of Assistance: Stand-by assistance        Gait Abnormalities: Antalgic;Decreased step clearance           Stance: Left decreased  Speed/Cheli: Pace decreased (<100 feet/min)  Step Length: Left shortened;Right shortened  Swing Pattern: Left asymmetrical                 Stairs:  Number of Stairs Trained: 4  Stairs - Level of Assistance: Contact guard assistance   Rail Use: Both      Pain Rating:  Pt rates pain as manageable    Activity Tolerance:   Good    After treatment patient left in no apparent distress:   Supine in bed, Call bell within reach, and Side rails x 3    COMMUNICATION/COLLABORATION:   The patients plan of care was discussed with: Registered nurse.      Vivek Cardenas, PT   Time Calculation: 57 mins

## 2021-07-18 NOTE — PROGRESS NOTES
POD 1 Day Post-Op    Procedure:  Procedure(s):  LEFT TOTAL KNEE ARTHROPLASTY    Subjective:     Patient has complaints of pain. Denies numbness or tingling. Had some mild light-headiness with therapy yesterday. Objective:     Blood pressure 127/64, pulse 86, temperature 98.7 °F (37.1 °C), resp. rate 14, weight 83.9 kg (185 lb), SpO2 94 %. Temp (24hrs), Av.7 °F (37.1 °C), Min:98.1 °F (36.7 °C), Max:99.6 °F (37.6 °C)      Physical Exam:  Examination of the left knee reveals that the dressing is clean and intact. Sensation is intact to light touch. Brisk capillary refill. +EHL/DF/PF    Labs:   Lab Results   Component Value Date/Time    HGB 10.1 (L) 2021 12:58 AM         Assessment:     Active Problems:    Primary osteoarthritis of left knee (2021)        Plan/Recommendations/Medical Decision Making:     Continue physical therapy  Ice and elevate  Begin discharge planning--likely home today after passes PT and pain well controlled.

## 2021-07-18 NOTE — PROGRESS NOTES
Problem: Mobility Impaired (Adult and Pediatric)  Goal: *Acute Goals and Plan of Care (Insert Text)  Description: FUNCTIONAL STATUS PRIOR TO ADMISSION: Patient was independent and active without use of DME.    HOME SUPPORT PRIOR TO ADMISSION: The patient lived with spouse but did not require assist.    Physical Therapy Goals  Initiated 7/16/2021    1. Patient will move from supine to sit and sit to supine  and scoot up and down in bed with modified independence within 4 days. 2. Patient will perform sit to stand with modified independence within 4 days. 3. Patient will ambulate with modified independence for > 150 feet with the least restrictive device within 4 days. 4. Patient will ascend/descend 4 stairs with one handrail(s) with supervision/set-up within 4 days. 5. Patient will perform home exercise program per protocol with supervision/set-up within 4 days. 6. Patient will demonstrate AROM 0-90 degrees in operative joint within 4 days. Outcome: Progressing Towards Goal   PHYSICAL THERAPY TREATMENT  Patient: Jose Alejandro Davies (16 y.o. female)  Date: 7/18/2021  Diagnosis: Localized osteoarthritis of knees, bilateral [M17.0] <principal problem not specified>  Procedure(s) (LRB):  LEFT TOTAL KNEE ARTHROPLASTY (Left) 2 Days Post-Op  Precautions: WBAT, Fall  Chart, physical therapy assessment, plan of care and goals were reviewed. ASSESSMENT  Patient continues with skilled PT services and is progressing towards goals. Barriers to indep with functional mobility include symptomatic BP drop with standing/ amb which decreases activity tolerance, post-op strength/ ROM deficits L knee. Pt tolerated amb in room with RW and CGA/ SBA, distance limited by onset of c/o weakness/ fatigue. Pt able to remain up in chair at end of session for first time; advised to call nurse for assist back to bed after 30 min. Pt will benefit from additional PT for gait progression and stair training prior to d/c.      Vitals:    07/18/21 0920 07/18/21 0928 07/18/21 0932 07/18/21 0934   BP: 107/69 125/77 118/62 129/77   BP 1 Location: Left arm Left arm Left arm Left arm   BP Patient Position: Supine Sitting  Comment: EOB Standing  Comment: after amb Sitting  Comment: in chair   Pulse: 90 (!) 114 (!) 121 (!) 101   Temp:       Resp:       Weight:       SpO2:            Current Level of Function Impacting Discharge (mobility/balance): supine to sit supervision, sit to stand SBA, short distance amb with RW CGA/ SBA    Other factors to consider for discharge: will have daughter staying with her at initial d/c, 2 level home environment         PLAN :  Patient continues to benefit from skilled intervention to address the above impairments. Continue treatment per established plan of care. to address goals. Recommendation for discharge: (in order for the patient to meet his/her long term goals)  Physical therapy at least 2 days/week in the home     This discharge recommendation:  Has been made in collaboration with the attending provider and/or case management    IF patient discharges home will need the following DME: patient owns DME required for discharge       SUBJECTIVE:   Patient stated It feels like something is draining out of my starting at my head re BP drop with amb    OBJECTIVE DATA SUMMARY:   Critical Behavior:  Neurologic State: Alert, Appropriate for age  Orientation Level: Appropriate for age, Oriented to place, Oriented to time, Oriented to situation  Cognition: Appropriate decision making, Appropriate for age attention/concentration, Appropriate safety awareness, Follows commands     Functional Mobility Training:  Bed Mobility:     Supine to Sit: Supervision     Scooting: Supervision        Transfers:  Sit to Stand: Stand-by assistance  Stand to Sit: Supervision (cues for L LE positioning)                             Balance:  Sitting: Intact  Standing: Intact; With support  Ambulation/Gait Training:  Distance (ft): 20 Feet (ft)  Assistive Device: Gait belt;Walker, rolling  Ambulation - Level of Assistance: Contact guard assistance;Stand-by assistance        Gait Abnormalities: Decreased step clearance; Antalgic           Stance: Left decreased  Speed/Cheli: Pace decreased (<100 feet/min)  Step Length: Left shortened;Right shortened  Swing Pattern: Left asymmetrical        Therapeutic Exercises:   Reviewed quad sets, gentle heel slides, ankle pumps, seated knee flex/ ext through partial range  Pain Rating:  Pt reports pain that is manageable    Activity Tolerance:   Fair and signs and symptoms of orthostatic hypotension    After treatment patient left in no apparent distress:   Sitting in chair and Call bell within reach    COMMUNICATION/COLLABORATION:   The patients plan of care was discussed with: Registered nurse.      Lorenza Franklin, PT   Time Calculation: 35 mins

## 2021-07-18 NOTE — PROGRESS NOTES
Patient discharged to home. Patient alert and oriented x 4. Vital signs remain stable and patient is afebrile. Discharge instructions and prescriptions discussed with patient. Patient viewed discharge video. Patient verbalizes understanding. Time allotted for questions. Patient and belongings transported to [facility] via wheel chair. PIV removed. Discharge instructions given to patient.

## 2022-01-19 ENCOUNTER — OFFICE VISIT (OUTPATIENT)
Dept: ORTHOPEDIC SURGERY | Age: 72
End: 2022-01-19
Payer: MEDICARE

## 2022-01-19 VITALS — BODY MASS INDEX: 33.13 KG/M2 | HEIGHT: 62 IN | WEIGHT: 180 LBS

## 2022-01-19 DIAGNOSIS — Z96.652 STATUS POST LEFT KNEE REPLACEMENT: Primary | ICD-10-CM

## 2022-01-19 PROCEDURE — 99024 POSTOP FOLLOW-UP VISIT: CPT | Performed by: ORTHOPAEDIC SURGERY

## 2022-01-20 NOTE — PROGRESS NOTES
Kierra Rivera (: 1950) is a 66 y.o. female, patient, here for evaluation of the following chief complaint(s):  Knee Pain (left total knee 2021)       HPI:    Left total knee doing fine no issues. Allergies   Allergen Reactions    Erythromycin Nausea and Vomiting       Current Outpatient Medications   Medication Sig    aspirin (ASPIRIN) 325 mg tablet Take 1 Tablet by mouth daily. Indications: post op DVT prevention    senna-docusate (PERICOLACE) 8.6-50 mg per tablet Take 1 Tablet by mouth two (2) times a day. Indications: opioid induced constipation    omeprazole (PRILOSEC) 20 mg capsule Take 20 mg by mouth every morning.  acetaminophen (TylenoL) 325 mg tablet Take 650 mg by mouth every four (4) hours as needed for Pain.  cetirizine (ZyrTEC) 10 mg tablet Take 10 mg by mouth daily as needed for Allergies. (Patient not taking: Reported on 2021)    azelas-fluticasone-NaCl-NaHCO3 137 mcg-50 mcg- 0.9 % ksps by Nasal route two (2) times a day. No current facility-administered medications for this visit.        Past Medical History:   Diagnosis Date    Arthritis     OSTEO-KNEES    COVID-19 vaccine series completed MODERNA    2021 AND 2021        Past Surgical History:   Procedure Laterality Date    HX ORTHOPAEDIC Right 2018    MENSICUIS REPAIR    HX TONSILLECTOMY         Family History   Problem Relation Age of Onset    Cancer Mother         LYMPHOMA    Cancer Father         LYMPHOMA    Lung Disease Father     Emphysema Father     No Known Problems Sister     No Known Problems Brother     No Known Problems Son     No Known Problems Daughter     No Known Problems Daughter     No Known Problems Daughter     Anesth Problems Neg Hx         Social History     Socioeconomic History    Marital status:      Spouse name: Not on file    Number of children: Not on file    Years of education: Not on file    Highest education level: Not on file   Occupational History    Not on file   Tobacco Use    Smoking status: Never Smoker    Smokeless tobacco: Never Used   Vaping Use    Vaping Use: Never used   Substance and Sexual Activity    Alcohol use: Not Currently    Drug use: Never    Sexual activity: Not on file   Other Topics Concern    Not on file   Social History Narrative    Not on file     Social Determinants of Health     Financial Resource Strain:     Difficulty of Paying Living Expenses: Not on file   Food Insecurity:     Worried About Running Out of Food in the Last Year: Not on file    Consuelo of Food in the Last Year: Not on file   Transportation Needs:     Lack of Transportation (Medical): Not on file    Lack of Transportation (Non-Medical): Not on file   Physical Activity:     Days of Exercise per Week: Not on file    Minutes of Exercise per Session: Not on file   Stress:     Feeling of Stress : Not on file   Social Connections:     Frequency of Communication with Friends and Family: Not on file    Frequency of Social Gatherings with Friends and Family: Not on file    Attends Latter-day Services: Not on file    Active Member of [address] [place] [address] or Organizations: Not on file    Attends Club or Organization Meetings: Not on file    Marital Status: Not on file   Intimate Partner Violence:     Fear of Current or Ex-Partner: Not on file    Emotionally Abused: Not on file    Physically Abused: Not on file    Sexually Abused: Not on file   Housing Stability:     Unable to Pay for Housing in the Last Year: Not on file    Number of [place] in the Last Year: Not on file    Unstable Housing in the Last Year: Not on file             Vitals:  Ht 5' 2\" (1.575 m)   Wt 81.6 kg (180 lb)   BMI 32.92 kg/m²    Body mass index is 32.92 kg/m². PHYSICAL EXAM:  Knee incision is benign appearing. Patient walks with no antalgia in her gait. IMAGING:  None    ASSESSMENT/PLAN:  1.  Status post left knee replacement    Doing well post left total knee all questions were answered. Follow-up 9 months. An electronic signature was used to authenticate this note.   --Yuniel Avelar MD

## 2022-03-20 PROBLEM — M17.12 PRIMARY OSTEOARTHRITIS OF LEFT KNEE: Status: ACTIVE | Noted: 2021-07-16

## 2022-05-11 ENCOUNTER — OFFICE VISIT (OUTPATIENT)
Dept: ORTHOPEDIC SURGERY | Age: 72
End: 2022-05-11
Payer: MEDICARE

## 2022-05-11 VITALS — WEIGHT: 180 LBS | BODY MASS INDEX: 33.13 KG/M2 | HEIGHT: 62 IN

## 2022-05-11 DIAGNOSIS — S63.502A LEFT WRIST SPRAIN, INITIAL ENCOUNTER: ICD-10-CM

## 2022-05-11 DIAGNOSIS — M79.642 LEFT HAND PAIN: Primary | ICD-10-CM

## 2022-05-11 DIAGNOSIS — G56.02 LEFT CARPAL TUNNEL SYNDROME: ICD-10-CM

## 2022-05-11 DIAGNOSIS — M65.332 TRIGGER FINGER, LEFT MIDDLE FINGER: ICD-10-CM

## 2022-05-11 PROCEDURE — 99203 OFFICE O/P NEW LOW 30 MIN: CPT | Performed by: ORTHOPAEDIC SURGERY

## 2022-05-11 RX ORDER — AZELASTINE HYDROCHLORIDE, FLUTICASONE PROPIONATE 137; 50 UG/1; UG/1
SPRAY, METERED NASAL
COMMUNITY
Start: 2022-04-01

## 2022-05-11 NOTE — PROGRESS NOTES
Edgar Sotelo (: 1950) is a 66 y.o. female patient here for evaluation of the following chief complaint(s):  Hand Pain (left hand pain)       ASSESSMENT/PLAN:  Below is the assessment and plan developed based on review of pertinent history, physical exam, labs, studies, and medications. 1. Left hand pain  -     XR HAND LT MIN 3 V; Future  2. Left carpal tunnel syndrome  3. Trigger finger, left middle finger  4. Left wrist sprain, initial encounter      Discussed treatment options with the patient I think that she has some additional swelling from the fall causing some early stenosing tenosynovitis of the left middle finger as well as some symptoms of carpal tunnel on the left. Likely also had a left wrist sprain over the TFC as she has some residual tenderness there but it is improving. Discussed a wrist brace as needed over the next few weeks. If symptoms persist or worsen would recommend repeat evaluation and possible steroid injection. She can also do over-the-counter medications if needed. Patient verbalized understanding and elected to proceed. All questions were answered to the patient's apparent satisfaction. SUBJECTIVE/OBJECTIVE:  HPI    76-year-old female sustained a fall 3-1/2 weeks ago. She landed on her left side and hit her left hand very hard on the pavement. Patient reports a sudden onset of symptoms. Duration of problem 3.5 weeks. Symptom Severity 3/10  Symptom Frequency intermittent        Allergies   Allergen Reactions    Erythromycin Nausea and Vomiting       Current Outpatient Medications   Medication Sig    azelastine-fluticasone (DYMISTA) 137-50 mcg/spray     senna-docusate (PERICOLACE) 8.6-50 mg per tablet Take 1 Tablet by mouth two (2) times a day. Indications: opioid induced constipation    omeprazole (PRILOSEC) 20 mg capsule Take 20 mg by mouth every morning.     acetaminophen (TylenoL) 325 mg tablet Take 650 mg by mouth every four (4) hours as needed for Pain.  cetirizine (ZyrTEC) 10 mg tablet Take 10 mg by mouth daily as needed for Allergies.  azelas-fluticasone-NaCl-NaHCO3 137 mcg-50 mcg- 0.9 % ksps by Nasal route two (2) times a day. No current facility-administered medications for this visit. Social History     Socioeconomic History    Marital status:      Spouse name: Not on file    Number of children: Not on file    Years of education: Not on file    Highest education level: Not on file   Occupational History    Not on file   Tobacco Use    Smoking status: Never Smoker    Smokeless tobacco: Never Used   Vaping Use    Vaping Use: Never used   Substance and Sexual Activity    Alcohol use: Not Currently    Drug use: Never    Sexual activity: Not on file   Other Topics Concern    Not on file   Social History Narrative    Not on file     Social Determinants of Health     Financial Resource Strain:     Difficulty of Paying Living Expenses: Not on file   Food Insecurity:     Worried About Running Out of Food in the Last Year: Not on file    Consuelo of Food in the Last Year: Not on file   Transportation Needs:     Lack of Transportation (Medical): Not on file    Lack of Transportation (Non-Medical):  Not on file   Physical Activity:     Days of Exercise per Week: Not on file    Minutes of Exercise per Session: Not on file   Stress:     Feeling of Stress : Not on file   Social Connections:     Frequency of Communication with Friends and Family: Not on file    Frequency of Social Gatherings with Friends and Family: Not on file    Attends Denominational Services: Not on file    Active Member of Clubs or Organizations: Not on file    Attends Club or Organization Meetings: Not on file    Marital Status: Not on file   Intimate Partner Violence:     Fear of Current or Ex-Partner: Not on file    Emotionally Abused: Not on file    Physically Abused: Not on file    Sexually Abused: Not on file   Housing Stability:     Unable to Pay for Housing in the Last Year: Not on file    Number of Places Lived in the Last Year: Not on file    Unstable Housing in the Last Year: Not on file       Past Surgical History:   Procedure Laterality Date    HX ORTHOPAEDIC Right 07/02/2018    MENSICUIS REPAIR    HX TONSILLECTOMY         Family History   Problem Relation Age of Onset    Cancer Mother         LYMPHOMA    Cancer Father         LYMPHOMA    Lung Disease Father     Emphysema Father     No Known Problems Sister     No Known Problems Brother     No Known Problems Son     No Known Problems Daughter     No Known Problems Daughter     No Known Problems Daughter     Anesth Problems Neg Hx             Review of Systems    No flowsheet data found. Vitals:  Ht 5' 2\" (1.575 m)   Wt 180 lb (81.6 kg)   BMI 32.92 kg/m²    Estimated body surface area is 1.89 meters squared as calculated from the following:    Height as of this encounter: 5' 2\" (1.575 m). Weight as of this encounter: 180 lb (81.6 kg). Body mass index is 32.92 kg/m². Physical Exam    Musculoskeletal Exam:    Left Upper Extremity EXAMINATION    Patient has tenderness to palpation at the left middle finger A1 pulley she has full range of motion of all digits. She has a negative Tinel's over the median nerve and positive median nerve compression test and positive Phalen's test.    Patient fires AIN, PIN and ulnar nerves. Sensation is grossly intact in the median, radial and ulnar distribution. Hand is pink and appears well-perfused. Hand is warm. Skin is intact. Compartments are soft and compressible.       Consitutional: Healthy  Skin:   - Edema - none  - Cellulitis - No    Neuro: Numbness or tingling in R/L arm: Yes    Psych: Affect normal    Cardiovascular: Capillary Refill < 2 seconds in upper extremities    Respiratory: Non-Labored Breathing    ROS:    Constitutional: Denies fever/chills    Respiratory: Denies SOB        Imaging:    XR Results (most recent):  Results from Appointment encounter on 05/11/22    XR HAND LT MIN 3 V    Narrative  Left Hand Xray  Indication: pain  Views: 3 views, AP/LAT/OBL    Interpretation: 3 views of the left hand are reviewed and show no acute or chronic osseous abnormalities. There are no fractures or dislocations noted. The carpal alignment is well-maintained with no obvious abnormalities noted. There is no evidence indicating obvious ligament tear noted on these x-rays. Normal bone architecture is noted. No evidence of soft tissue swelling is noted. Orders Placed This Encounter    XR HAND LT MIN 3 V     Please perform x-rays of the left hand to include an AP lateral and oblique views     Standing Status:   Future     Number of Occurrences:   1     Standing Expiration Date:   6/11/2022     Order Specific Question:   Reason for Exam     Answer:   LEFT HAND PAIN            An electronic signature was used to authenticate this note.   -- Hermelindo Nunez MD

## 2023-05-22 RX ORDER — ACETAMINOPHEN 325 MG/1
650 TABLET ORAL EVERY 4 HOURS PRN
COMMUNITY

## 2023-05-22 RX ORDER — AMOXICILLIN 250 MG
1 CAPSULE ORAL 2 TIMES DAILY
COMMUNITY
Start: 2021-07-16

## 2023-05-22 RX ORDER — OMEPRAZOLE 20 MG/1
20 CAPSULE, DELAYED RELEASE ORAL
COMMUNITY

## 2023-05-22 RX ORDER — AZELASTINE HYDROCHLORIDE, FLUTICASONE PROPIONATE 137; 50 UG/1; UG/1
SPRAY, METERED NASAL
COMMUNITY
Start: 2022-04-01

## 2023-05-22 RX ORDER — CETIRIZINE HYDROCHLORIDE 10 MG/1
10 TABLET ORAL DAILY PRN
COMMUNITY

## (undated) DEVICE — PADDING CAST SPEC 6INX4YD COT --

## (undated) DEVICE — SUTURE VCRL SZ 0 L36IN ABSRB VLT L40MM CT 1/2 CIR J358H

## (undated) DEVICE — SOLUTION IRRIG 1000ML STRL H2O USP PLAS POUR BTL

## (undated) DEVICE — STERILE POLYISOPRENE POWDER-FREE SURGICAL GLOVES: Brand: PROTEXIS

## (undated) DEVICE — SYR 20ML LL STRL LF --

## (undated) DEVICE — CONTAINER,SPECIMEN,3OZ,OR STRL: Brand: MEDLINE

## (undated) DEVICE — SUTURE VCRL SZ 2 L54IN ABSRB UD L65MM TP-1 1/2 CIR J880T

## (undated) DEVICE — 3M™ IOBAN™ 2 ANTIMICROBIAL INCISE DRAPE 6651EZ: Brand: IOBAN™ 2

## (undated) DEVICE — STERILE POLYISOPRENE POWDER-FREE SURGICAL GLOVES WITH EMOLLIENT COATING: Brand: PROTEXIS

## (undated) DEVICE — SOLUTION SURG PREP 26 CC PURPREP

## (undated) DEVICE — DRSG POSTOP PRMSL AG 3.5X14IN

## (undated) DEVICE — SUTURE STRATAFIX SYMMETRIC PDS + SZ 1 L18IN ABSRB VLT L48MM SXPP1A400

## (undated) DEVICE — 4-PORT MANIFOLD: Brand: NEPTUNE 2

## (undated) DEVICE — TAPE,CLOTH/SILK,CURAD,3"X10YD,LF,40/CS: Brand: CURAD

## (undated) DEVICE — BANDAGE COMPR M W6INXL10YD WHT BGE VELC E MTRX HK AND LOOP

## (undated) DEVICE — BOWL BNE CEM MIX SPAT CURET SMARTMIX CTS

## (undated) DEVICE — BLADE SAW W0.49XL3.15IN THK0.047IN CUT THK0.047IN REPL SAG

## (undated) DEVICE — PADDING CST 6IN STERILE --

## (undated) DEVICE — Device

## (undated) DEVICE — T4 HOOD

## (undated) DEVICE — ZIMMER® STERILE DISPOSABLE TOURNIQUET CUFF WITH PLC, DUAL PORT, SINGLE BLADDER, 34 IN. (86 CM)

## (undated) DEVICE — REM POLYHESIVE ADULT PATIENT RETURN ELECTRODE: Brand: VALLEYLAB

## (undated) DEVICE — STAPLER SKIN LEG L3.9MM DIA0.53MM WIDE ROT HD FOR WND CLSR

## (undated) DEVICE — SUTURE VCRL SZ 2-0 L36IN ABSRB UD L40MM CT 1/2 CIR J957H

## (undated) DEVICE — DRAPE,EXTREMITY,89X128,STERILE: Brand: MEDLINE

## (undated) DEVICE — SOLUTION IRRIG 3000ML 0.9% SOD CHL USP UROMATIC PLAS CONT

## (undated) DEVICE — HEADLESS TROCHAR PIN 75MM: Brand: ZUK

## (undated) DEVICE — TOTAL TRAY, 16FR 10ML SIL FOLEY, URN: Brand: MEDLINE

## (undated) DEVICE — HANDPIECE SET WITH BONE CLEANING TIP AND SUCTION TUBE: Brand: INTERPULSE

## (undated) DEVICE — SCRUB DRY SURG EZ SCRUB BRUSH PREOPERATIVE GRN